# Patient Record
Sex: FEMALE | Race: WHITE | Employment: FULL TIME | URBAN - METROPOLITAN AREA
[De-identification: names, ages, dates, MRNs, and addresses within clinical notes are randomized per-mention and may not be internally consistent; named-entity substitution may affect disease eponyms.]

---

## 2021-08-27 ENCOUNTER — NURSE NAVIGATOR (OUTPATIENT)
Dept: CASE MANAGEMENT | Age: 61
End: 2021-08-27

## 2021-08-27 ENCOUNTER — DOCUMENTATION ONLY (OUTPATIENT)
Dept: SURGERY | Age: 61
End: 2021-08-27

## 2021-08-27 NOTE — PROGRESS NOTES
[address]  Breast Navigator Encounter    Name:    Nubia Gonzalez  Age:    64 y.o. Diagnosis:    RIGHT breast cancer      Interdisciplinary Team:  Med-Onc:    Surg-Onc:    Dr. Casper Raw:    Plastics:    :     Nurse Navigator:  Ladena Leyden, RN, BSN, [facility]      Encounter type:  []Patient Initiated  []Navigator Follow-up []Pre-op  []Post-op  []Check-in Prior to First Treatment []Treatment Modality Change  []Survivorship Transition [x]Other:   Initial Navigator Encounter    Narrative:    Called patient prior to her appt next week. She was diagnosed at [facility]. She has already had her breast MRI [facility]). Knows that the cancer is stage 1, ER+/MS+/HER-2-. I explained that this measures 1.8 cm on MRI, but she does have a post-biopsy hematoma. Discussed surgery. She is interested in a lumpectomy. We discussed recovery after a lumpectomy. Explained that if she has XRT after surgery, that this would start about one month post-op. Discussed scheduling of surgery. She was hoping to get scheduled in the next week or so. Has vacation scheduled for the beginning of October. I told her that she does not need to cancel this because surgery can be in September, and she could have radiation (if this is the next step) after she comes back later in October. Is nervous about surgery, particularly intubation. I told her to discuss this with anesthesia when she sees them pre-op. I explained Monday's appointment, and she is going to bring her  with her to that visit. Provided the patient with my contact information and discussed my role in her care. Will continue to follow patient throughout  the care continuum.               Ladena Leyden, RN, BSN, [facility]  Oncology Breast Navigator     [address]  [address] [facility], [facility], [address]  W: [phone]  F: [phone]  [email or web address]  Good Help to Those in [facility]

## 2021-08-27 NOTE — PROGRESS NOTES
Outside breast imaging CD received at DR WENDIE ANDREWS [facility] G11. Will place CD in top drawer on nurses station in preparation for appointment on Monday 8/30/21.

## 2021-08-30 ENCOUNTER — OFFICE VISIT (OUTPATIENT)
Dept: SURGERY | Age: 61
End: 2021-08-30
Payer: COMMERCIAL

## 2021-08-30 ENCOUNTER — DOCUMENTATION ONLY (OUTPATIENT)
Dept: SURGERY | Age: 61
End: 2021-08-30

## 2021-08-30 VITALS
DIASTOLIC BLOOD PRESSURE: 102 MMHG | HEIGHT: 64 IN | BODY MASS INDEX: 26.46 KG/M2 | SYSTOLIC BLOOD PRESSURE: 106 MMHG | HEART RATE: 83 BPM | WEIGHT: 155 LBS

## 2021-08-30 DIAGNOSIS — C50.411 MALIGNANT NEOPLASM OF UPPER-OUTER QUADRANT OF RIGHT BREAST IN FEMALE, ESTROGEN RECEPTOR POSITIVE (HCC): Primary | ICD-10-CM

## 2021-08-30 DIAGNOSIS — F41.9 ANXIETY: ICD-10-CM

## 2021-08-30 DIAGNOSIS — Z17.0 MALIGNANT NEOPLASM OF UPPER-OUTER QUADRANT OF RIGHT BREAST IN FEMALE, ESTROGEN RECEPTOR POSITIVE (HCC): Primary | ICD-10-CM

## 2021-08-30 PROCEDURE — 76642 ULTRASOUND BREAST LIMITED: CPT | Performed by: SURGERY

## 2021-08-30 PROCEDURE — 99245 OFF/OP CONSLTJ NEW/EST HI 55: CPT | Performed by: SURGERY

## 2021-08-30 RX ORDER — FEXOFENADINE HCL AND PSEUDOEPHEDRINE HCI 60; 120 MG/1; MG/1
1 TABLET, EXTENDED RELEASE ORAL
COMMUNITY

## 2021-08-30 RX ORDER — HYDROCHLOROTHIAZIDE 25 MG/1
25 TABLET ORAL DAILY
COMMUNITY
Start: 2021-08-24

## 2021-08-30 RX ORDER — FLUTICASONE PROPIONATE 50 MCG
2 SPRAY, SUSPENSION (ML) NASAL 2 TIMES DAILY
COMMUNITY

## 2021-08-30 RX ORDER — ALPRAZOLAM 0.5 MG/1
0.5 TABLET ORAL
Qty: 30 TABLET | Refills: 0 | Status: SHIPPED | OUTPATIENT
Start: 2021-08-30

## 2021-08-30 RX ORDER — METHYLPHENIDATE HYDROCHLORIDE 20 MG/1
TABLET ORAL
COMMUNITY
Start: 2021-06-23

## 2021-08-30 RX ORDER — LEVOTHYROXINE SODIUM 100 UG/1
TABLET ORAL
COMMUNITY
Start: 2021-07-03

## 2021-08-30 NOTE — PATIENT INSTRUCTIONS
Breast Cancer: Care Instructions  Your Care Instructions     Breast cancer occurs when abnormal cells grow out of control in the breast. These cancer cells can spread within the breast, to nearby lymph nodes and other tissues, and to other parts of the body. Being treated for cancer can weaken your body, and you may feel very tired. Get the rest your body needs so you can feel better. Finding out that you have cancer is scary. You may feel many emotions and may need some help coping. Seek out family, friends, and counselors for support. You also can do things at home to make yourself feel better while you go through treatment. Call the [employer] (3-467.918.5339) or visit its website at [employer] for more information. Follow-up care is a key part of your treatment and safety. Be sure to make and go to all appointments, and call your doctor if you are having problems. It's also a good idea to know your test results and keep a list of the medicines you take. How can you care for yourself at home? · Take your medicines exactly as prescribed. Call your doctor if you think you are having a problem with your medicine. You may get medicine for nausea and vomiting if you have these side effects. · Follow your doctor's instructions to relieve pain. Pain from cancer and surgery can almost always be controlled. Use pain medicine when you first notice pain, before it becomes severe. · Eat healthy food. If you do not feel like eating, try to eat food that has protein and extra calories to keep up your strength and prevent weight loss. Drink liquid meal replacements for extra calories and protein. Try to eat your main meal early. · Get some physical activity every day, but do not get too tired. Keep doing the hobbies you enjoy as your energy allows. · Do not smoke. Smoking can make your cancer worse. If you need help quitting, talk to your doctor about stop-smoking programs and medicines.  These can increase your chances of quitting for good. · Take steps to control your stress and workload. Learn relaxation techniques. ? Share your feelings. Stress and tension affect our emotions. By expressing your feelings to others, you may be able to understand and cope with them. ? Consider joining a support group. Talking about a problem with your spouse, a good friend, or other people with similar problems is a good way to reduce tension and stress. ? Express yourself through art. Try writing, crafts, dance, or art to relieve stress. Some dance, writing, or art groups may be available just for people who have cancer. ? Be kind to your body and mind. Getting enough sleep, eating a healthy diet, and taking time to do things you enjoy can contribute to an overall feeling of balance in your life and can help reduce stress. ? Get help if you need it. Discuss your concerns with your doctor or counselor. · If you are vomiting or have diarrhea:  ? Drink plenty of fluids to prevent dehydration. Choose water and other caffeine-free clear liquids. If you have kidney, heart, or liver disease and have to limit fluids, talk with your doctor before you increase the amount of fluids you drink. ? When you are able to eat, try clear soups, mild foods, and liquids until all symptoms are gone for 12 to 48 hours. Other good choices include dry toast, crackers, cooked cereal, and gelatin dessert, such as Jell-O.  · If you have not already done so, prepare a list of advance directives. Advance directives are instructions to your doctor and family members about what kind of care you want if you become unable to speak or express yourself. When should you call for help? Call 911 anytime you think you may need emergency care. For example, call if:    · You passed out (lost consciousness).    Call your doctor now or seek immediate medical care if:    · You have a fever.     · You have abnormal bleeding.     · You think you have an infection.     · You have new or worse pain.     · You have new symptoms, such as a cough, belly pain, vomiting, diarrhea, or a rash. Watch closely for changes in your health, and be sure to contact your doctor if:    · You are much more tired than usual.     · You have swollen glands in your armpits, groin, or neck.     · You do not get better as expected. Where can you learn more? Go to http://www.[employer].com/  Enter V321 in the search box to learn more about \"Breast Cancer: Care Instructions. \"  Current as of: December 17, 2020               Content Version: 12.8  © 6315-6879 [employer]. Care instructions adapted under license by [employer] (which disclaims liability or warranty for this information). If you have questions about a medical condition or this instruction, always ask your healthcare professional. [address] any warranty or liability for your use of this information.

## 2021-08-30 NOTE — PROGRESS NOTES
HISTORY OF PRESENT ILLNESS  Trever Baxter is a 64 y.o. female.   HPI    ROS    Physical Exam    ASSESSMENT and PLAN  {ASSESSMENT/PLAN:89403}

## 2021-08-30 NOTE — PROGRESS NOTES
HISTORY OF PRESENT ILLNESS  Bree Chandler is a 64 y.o. female. HPI NEW Patient presents for consultation for newly diagnosed RIGHT breast cancer. She had no abnormal breast symptoms to report. This was detected from mammogram which led to diagnostic mammogram, breast US, and subsequent biopsy. Biopsy site is healing well. No history or prior breast biopsies. Breast cancer-  8/10/21 - RIGHT breast 12 OC IDC, grade 1, ER positive 95%, RI positive 30%, HER2 negative. Family history-  Mother diagnosed with breast cancer at age 72. . Father had prostate cancer. Breast imaging-  21 - screening mammogram: BI-RADS 0  8/10/21 - RIGHT breast diagnostic mammogram done and breast US done at [facility]: BI-RADS 4 4 mm mass 12:00 right breast   21 - breast MRI done at [address] [place] [address]  History reviewed. No pertinent past medical history. History reviewed. No pertinent surgical history. Social History     Socioeconomic History    Marital status:      Spouse name: Not on file    Number of children: Not on file    Years of education: Not on file    Highest education level: Not on file   Occupational History    Not on file   Tobacco Use    Smoking status: Never Smoker   Substance and Sexual Activity    Alcohol use: Yes    Drug use: Not on file    Sexual activity: Not on file   Other Topics Concern    Not on file   Social History Narrative    Not on file     Social Determinants of Health     Financial Resource Strain:     Difficulty of Paying Living Expenses:    Food Insecurity:     Worried About Running Out of Food in the Last Year:     [address] in the Last Year:    Transportation Needs:     Lack of Transportation (Medical):      Lack of Transportation (Non-Medical):    Physical Activity:     Days of Exercise per Week:     Minutes of Exercise per Session:    Stress:     Feeling of Stress :    Social Connections:     Frequency of Communication with Friends and Family:  Frequency of Social Gatherings with Friends and Family:     Attends [employer] Services:     Active Member of Clubs or Organizations:     Attends Club or Organization Meetings:     Marital Status:    Intimate Partner Violence:     Fear of Current or Ex-Partner:     Emotionally Abused:     Physically Abused:     Sexually Abused:      OB History    No obstetric history on file. Obstetric Comments   Menarche 15, LMP 2012, # of children 2, age of 4st delivery 25, Hysterectomy/oophorectomy /NO/NO, Breast bx NO, history of breast feeding NO, BCP YES, Hormone therapy YES             Current Outpatient Medications:     fluticasone propionate (Children's Flonase Allergy Rlf) 50 mcg/actuation nasal spray, 2 Sprays by Both Nostrils route daily. , Disp: , Rfl:     fexofenadine-pseudoephedrine (Allegra-D 12 Hour)  mg Tb12, Take 1 Tablet by mouth every twelve (12) hours. , Disp: , Rfl:     OTHER, Allergy Injections [id number], Disp: , Rfl:     ALPRAZolam (XANAX) 0.5 mg tablet, Take 1 Tablet by mouth three (3) times daily as needed for Anxiety. Max Daily Amount: 1.5 mg., Disp: 30 Tablet, Rfl: 0    hydroCHLOROthiazide (HYDRODIURIL) 25 mg tablet, Take 25 mg by mouth daily. , Disp: , Rfl:     Euthyrox 100 mcg tablet, TAKE 1 TABLET BY MOUTH ONCE DAILY FOR 90 DAYS, Disp: , Rfl:     methylphenidate HCl (RITALIN) 20 mg tablet, TAKE 1 TABLET BY MOUTH THREE TIMES DAILY ON AN EMPTY STOMACH FOR 90 DAYS, Disp: , Rfl:   Allergies   Allergen Reactions    Aller Ext-American Cockroach Itching, Cough and Sneezing    Allerg Ext-Tall Ragweed Pollen Itching    Dog Dander Itching, Cough and Sneezing    Grass Pollen Itching and Sneezing    Mold Shortness of Breath, Itching and Cough    Percocet [Oxycodone-Acetaminophen] Nausea and Vomiting    Tree And Shrub Pollen Shortness of Breath, Itching and Sneezing    Weed Pollen-Short Ragweed Shortness of Breath, Itching, Cough and Sneezing       Review of Systems Constitutional: Negative. HENT: Negative. Eyes: Negative. Respiratory: Negative. Cardiovascular: Negative. Gastrointestinal: Negative. Genitourinary: Negative. Musculoskeletal: Negative. Skin: Negative. Neurological: Negative. Endo/Heme/Allergies: Negative. Psychiatric/Behavioral: Negative. All other systems reviewed and are negative. Physical Exam  Vitals and nursing note reviewed. Constitutional:       Appearance: She is well-developed. HENT:      Head: Normocephalic. Neck:      Thyroid: No thyromegaly. Cardiovascular:      Rate and Rhythm: Normal rate and regular rhythm. Heart sounds: Normal heart sounds. Pulmonary:      Effort: Pulmonary effort is normal.      Breath sounds: Normal breath sounds. Chest:      Breasts: Breasts are symmetrical.         Right: No inverted nipple, mass, nipple discharge, skin change or tenderness. Left: No inverted nipple, mass, nipple discharge, skin change or tenderness. Abdominal:      Palpations: Abdomen is soft. Musculoskeletal:         General: Normal range of motion. Cervical back: Neck supple. Lymphadenopathy:      Cervical: No cervical adenopathy. Skin:     General: Skin is warm and dry. Neurological:      Mental Status: She is alert and oriented to person, place, and time. BREAST ULTRASOUND, Preop planning  Indication:preop planning  right Breast 12:00    Technique: The area was scanned using a high-frequency linear-array near-field transducer  Findings: right breast lesion and clip seen 12:00 3 cfn  Impression: Biopsy site visible with ultrasound  Disposition:  Will schedule lumpectomy with sentinel lymph node biopsy  ASSESSMENT and PLAN    ICD-10-CM ICD-9-CM    1. Malignant neoplasm of upper-outer quadrant of right breast in female, estrogen receptor positive (HCC)  C50.411 174.4     Z17.0 V86.0    2.  Anxiety  F41.9 300.00 ALPRAZolam (XANAX) 0.5 mg tablet     65 yo female with Breast cancer-  8/10/21 - RIGHT breast 12 OC IDC, grade 1, ER positive 95%, [place] positive 30%, HER2 negative. She is here with her   I have reviewed the imaging and pathology with her and she was given copies of these reports. 90 minutes were spent face-to-face with the patient during this encounter and 90% of that time was spent on counseling and coordination of care. 1. Discussed lumpectomy and radiation vs mastectomy. Discussed reconstruction. 2. Discussed sentinel lymph node biopsy. 3. Discussed external beam radiation. 4. Discussed hormone therapy. 5. Discussed the possibility of chemotherapy. Plan is right us guided lumpectomy, sln biopsy  The procedure and risks were discussed. Risks include bleeding, bruising, scar, infection, need for more surgery and lymphedema. She understood and wished to proceed. Will schedule.

## 2021-08-30 NOTE — PROGRESS NOTES
Type of Film: [x] CD [] FILMS  Type of Test: [] MRI [x] MAMMO  From: [address]  Given to: [facility]  LOCATION  To be Downloaded into PACS:  YES

## 2021-08-30 NOTE — H&P (VIEW-ONLY)
HISTORY OF PRESENT ILLNESS  Bhavna Ojeda is a 64 y.o. female. HPI NEW Patient presents for consultation for newly diagnosed RIGHT breast cancer. She had no abnormal breast symptoms to report. This was detected from mammogram which led to diagnostic mammogram, breast US, and subsequent biopsy. Biopsy site is healing well. No history or prior breast biopsies. Breast cancer-  8/10/21 - RIGHT breast 12 OC IDC, grade 1, ER positive 95%, TN positive 30%, HER2 negative. Family history-  Mother diagnosed with breast cancer at age 72. . Father had prostate cancer. Breast imaging-  21 - screening mammogram: BI-RADS 0  8/10/21 - RIGHT breast diagnostic mammogram done and breast US done at [facility]: BI-RADS 4 4 mm mass 12:00 right breast   21 - breast MRI done at [address] [place] [address]  History reviewed. No pertinent past medical history. History reviewed. No pertinent surgical history. Social History     Socioeconomic History    Marital status:      Spouse name: Not on file    Number of children: Not on file    Years of education: Not on file    Highest education level: Not on file   Occupational History    Not on file   Tobacco Use    Smoking status: Never Smoker   Substance and Sexual Activity    Alcohol use: Yes    Drug use: Not on file    Sexual activity: Not on file   Other Topics Concern    Not on file   Social History Narrative    Not on file     Social Determinants of Health     Financial Resource Strain:     Difficulty of Paying Living Expenses:    Food Insecurity:     Worried About Running Out of Food in the Last Year:     [address] in the Last Year:    Transportation Needs:     Lack of Transportation (Medical):      Lack of Transportation (Non-Medical):    Physical Activity:     Days of Exercise per Week:     Minutes of Exercise per Session:    Stress:     Feeling of Stress :    Social Connections:     Frequency of Communication with Friends and Family:  Frequency of Social Gatherings with Friends and Family:     Attends Restorationist Services:     Active Member of Clubs or Organizations:     Attends Club or Organization Meetings:     Marital Status:    Intimate Partner Violence:     Fear of Current or Ex-Partner:     Emotionally Abused:     Physically Abused:     Sexually Abused:      OB History    No obstetric history on file. Obstetric Comments   Menarche 15, LMP 2012, # of children 2, age of 4st delivery 25, Hysterectomy/oophorectomy /NO/NO, Breast bx NO, history of breast feeding NO, BCP YES, Hormone therapy YES             Current Outpatient Medications:     fluticasone propionate (Children's Flonase Allergy Rlf) 50 mcg/actuation nasal spray, 2 Sprays by Both Nostrils route daily. , Disp: , Rfl:     fexofenadine-pseudoephedrine (Allegra-D 12 Hour)  mg Tb12, Take 1 Tablet by mouth every twelve (12) hours. , Disp: , Rfl:     OTHER, Allergy Injections [id number], Disp: , Rfl:     ALPRAZolam (XANAX) 0.5 mg tablet, Take 1 Tablet by mouth three (3) times daily as needed for Anxiety. Max Daily Amount: 1.5 mg., Disp: 30 Tablet, Rfl: 0    hydroCHLOROthiazide (HYDRODIURIL) 25 mg tablet, Take 25 mg by mouth daily. , Disp: , Rfl:     Euthyrox 100 mcg tablet, TAKE 1 TABLET BY MOUTH ONCE DAILY FOR 90 DAYS, Disp: , Rfl:     methylphenidate HCl (RITALIN) 20 mg tablet, TAKE 1 TABLET BY MOUTH THREE TIMES DAILY ON AN EMPTY STOMACH FOR 90 DAYS, Disp: , Rfl:   Allergies   Allergen Reactions    Aller Ext-American Cockroach Itching, Cough and Sneezing    Allerg Ext-Tall Ragweed Pollen Itching    Dog Dander Itching, Cough and Sneezing    Grass Pollen Itching and Sneezing    Mold Shortness of Breath, Itching and Cough    Percocet [Oxycodone-Acetaminophen] Nausea and Vomiting    Tree And Shrub Pollen Shortness of Breath, Itching and Sneezing    Weed Pollen-Short Ragweed Shortness of Breath, Itching, Cough and Sneezing       Review of Systems Constitutional: Negative. HENT: Negative. Eyes: Negative. Respiratory: Negative. Cardiovascular: Negative. Gastrointestinal: Negative. Genitourinary: Negative. Musculoskeletal: Negative. Skin: Negative. Neurological: Negative. Endo/Heme/Allergies: Negative. Psychiatric/Behavioral: Negative. All other systems reviewed and are negative. Physical Exam  Vitals and nursing note reviewed. Constitutional:       Appearance: She is well-developed. HENT:      Head: Normocephalic. Neck:      Thyroid: No thyromegaly. Cardiovascular:      Rate and Rhythm: Normal rate and regular rhythm. Heart sounds: Normal heart sounds. Pulmonary:      Effort: Pulmonary effort is normal.      Breath sounds: Normal breath sounds. Chest:      Breasts: Breasts are symmetrical.         Right: No inverted nipple, mass, nipple discharge, skin change or tenderness. Left: No inverted nipple, mass, nipple discharge, skin change or tenderness. Abdominal:      Palpations: Abdomen is soft. Musculoskeletal:         General: Normal range of motion. Cervical back: Neck supple. Lymphadenopathy:      Cervical: No cervical adenopathy. Skin:     General: Skin is warm and dry. Neurological:      Mental Status: She is alert and oriented to person, place, and time. BREAST ULTRASOUND, Preop planning  Indication:preop planning  right Breast 12:00    Technique: The area was scanned using a high-frequency linear-array near-field transducer  Findings: right breast lesion and clip seen 12:00 3 cfn  Impression: Biopsy site visible with ultrasound  Disposition:  Will schedule lumpectomy with sentinel lymph node biopsy  ASSESSMENT and PLAN    ICD-10-CM ICD-9-CM    1. Malignant neoplasm of upper-outer quadrant of right breast in female, estrogen receptor positive (HCC)  C50.411 174.4     Z17.0 V86.0    2.  Anxiety  F41.9 300.00 ALPRAZolam (XANAX) 0.5 mg tablet     65 yo female with Breast cancer-  8/10/21 - RIGHT breast 12 OC IDC, grade 1, ER positive 95%, [place] positive 30%, HER2 negative. She is here with her   I have reviewed the imaging and pathology with her and she was given copies of these reports. 90 minutes were spent face-to-face with the patient during this encounter and 90% of that time was spent on counseling and coordination of care. 1. Discussed lumpectomy and radiation vs mastectomy. Discussed reconstruction. 2. Discussed sentinel lymph node biopsy. 3. Discussed external beam radiation. 4. Discussed hormone therapy. 5. Discussed the possibility of chemotherapy. Plan is right us guided lumpectomy, sln biopsy  The procedure and risks were discussed. Risks include bleeding, bruising, scar, infection, need for more surgery and lymphedema. She understood and wished to proceed. Will schedule.

## 2021-09-02 ENCOUNTER — NURSE NAVIGATOR (OUTPATIENT)
Dept: SURGERY | Age: 61
End: 2021-09-02

## 2021-09-02 DIAGNOSIS — C50.411 MALIGNANT NEOPLASM OF UPPER-OUTER QUADRANT OF RIGHT BREAST IN FEMALE, ESTROGEN RECEPTOR POSITIVE (HCC): Primary | ICD-10-CM

## 2021-09-02 DIAGNOSIS — Z17.0 MALIGNANT NEOPLASM OF UPPER-OUTER QUADRANT OF RIGHT BREAST IN FEMALE, ESTROGEN RECEPTOR POSITIVE (HCC): Primary | ICD-10-CM

## 2021-09-03 ENCOUNTER — TRANSCRIBE ORDER (OUTPATIENT)
Dept: REGISTRATION | Age: 61
End: 2021-09-03

## 2021-09-03 DIAGNOSIS — Z01.812 PRE-PROCEDURE LAB EXAM: Primary | ICD-10-CM

## 2021-09-07 ENCOUNTER — NURSE NAVIGATOR (OUTPATIENT)
Dept: CASE MANAGEMENT | Age: 61
End: 2021-09-07

## 2021-09-07 ENCOUNTER — HOSPITAL ENCOUNTER (OUTPATIENT)
Dept: PREADMISSION TESTING | Age: 61
Discharge: HOME | End: 2021-09-07
Payer: COMMERCIAL

## 2021-09-07 VITALS
WEIGHT: 156.28 LBS | SYSTOLIC BLOOD PRESSURE: 131 MMHG | RESPIRATION RATE: 16 BRPM | TEMPERATURE: 98.5 F | HEART RATE: 62 BPM | OXYGEN SATURATION: 96 % | BODY MASS INDEX: 26.68 KG/M2 | HEIGHT: 64 IN | DIASTOLIC BLOOD PRESSURE: 77 MMHG

## 2021-09-07 LAB
ANION GAP SERPL CALC-SCNC: 6 MMOL/L (ref 5–15)
ATRIAL RATE: 64 BPM
BASOPHILS # BLD: 0 K/UL (ref 0–0.1)
BASOPHILS NFR BLD: 1 % (ref 0–1)
BUN SERPL-MCNC: 15 MG/DL (ref 6–20)
BUN/CREAT SERPL: 22 (ref 12–20)
CALCIUM SERPL-MCNC: 9 MG/DL (ref 8.5–10.1)
CALCULATED P AXIS, ECG09: 45 DEGREES
CALCULATED R AXIS, ECG10: 26 DEGREES
CALCULATED T AXIS, ECG11: 71 DEGREES
CHLORIDE SERPL-SCNC: 103 MMOL/L (ref 97–108)
CO2 SERPL-SCNC: 30 MMOL/L (ref 21–32)
CREAT SERPL-MCNC: 0.68 MG/DL (ref 0.55–1.02)
DIAGNOSIS, 93000: NORMAL
DIFFERENTIAL METHOD BLD: NORMAL
EOSINOPHIL # BLD: 0.1 K/UL (ref 0–0.4)
EOSINOPHIL NFR BLD: 2 % (ref 0–7)
ERYTHROCYTE [DISTWIDTH] IN BLOOD BY AUTOMATED COUNT: 12.8 % (ref 11.5–14.5)
GLUCOSE SERPL-MCNC: 92 MG/DL (ref 65–100)
HCT VFR BLD AUTO: 38.9 % (ref 35–47)
HGB BLD-MCNC: 12.6 G/DL (ref 11.5–16)
IMM GRANULOCYTES # BLD AUTO: 0 K/UL (ref 0–0.04)
IMM GRANULOCYTES NFR BLD AUTO: 0 % (ref 0–0.5)
LYMPHOCYTES # BLD: 1.5 K/UL (ref 0.8–3.5)
LYMPHOCYTES NFR BLD: 30 % (ref 12–49)
MCH RBC QN AUTO: 31 PG (ref 26–34)
MCHC RBC AUTO-ENTMCNC: 32.4 G/DL (ref 30–36.5)
MCV RBC AUTO: 95.6 FL (ref 80–99)
MONOCYTES # BLD: 0.4 K/UL (ref 0–1)
MONOCYTES NFR BLD: 7 % (ref 5–13)
NEUTS SEG # BLD: 3.1 K/UL (ref 1.8–8)
NEUTS SEG NFR BLD: 60 % (ref 32–75)
NRBC # BLD: 0 K/UL (ref 0–0.01)
NRBC BLD-RTO: 0 PER 100 WBC
P-R INTERVAL, ECG05: 154 MS
PLATELET # BLD AUTO: 285 K/UL (ref 150–400)
PMV BLD AUTO: 9.8 FL (ref 8.9–12.9)
POTASSIUM SERPL-SCNC: 4.1 MMOL/L (ref 3.5–5.1)
Q-T INTERVAL, ECG07: 434 MS
QRS DURATION, ECG06: 72 MS
QTC CALCULATION (BEZET), ECG08: 447 MS
RBC # BLD AUTO: 4.07 M/UL (ref 3.8–5.2)
SODIUM SERPL-SCNC: 139 MMOL/L (ref 136–145)
VENTRICULAR RATE, ECG03: 64 BPM
WBC # BLD AUTO: 5.2 K/UL (ref 3.6–11)

## 2021-09-07 PROCEDURE — 36415 COLL VENOUS BLD VENIPUNCTURE: CPT

## 2021-09-07 PROCEDURE — 85025 COMPLETE CBC W/AUTO DIFF WBC: CPT

## 2021-09-07 PROCEDURE — 93005 ELECTROCARDIOGRAM TRACING: CPT

## 2021-09-07 PROCEDURE — 80048 BASIC METABOLIC PNL TOTAL CA: CPT

## 2021-09-07 RX ORDER — BISMUTH SUBSALICYLATE 262 MG
1 TABLET,CHEWABLE ORAL DAILY
COMMUNITY

## 2021-09-07 RX ORDER — CHOLECALCIFEROL TAB 125 MCG (5000 UNIT) 125 MCG
5000 TAB ORAL DAILY
COMMUNITY

## 2021-09-07 NOTE — PROGRESS NOTES
[address]  Breast Navigator Encounter    Name:    Lloyd Freire  Age:    64 y.o. Diagnosis:    RIGHT breast cancer    Interdisciplinary Team:  Med-Onc:    Surg-Onc:    Dr. Dale Kang:    Plastics:    :     Nurse Navigator:  Adi Gould, RN, BSN, [facility] [place]      Encounter type:  [x]Patient Initiated  []Navigator Follow-up []Pre-op  []Post-op  []Check-in Prior to First Treatment []Treatment Modality Change  []Survivorship Transition []Other:       Narrative:    Asked about anesthesia providers for ELADIO BECK. Wants to make sure her insurance participates. I let her know that the anesthesia group is called [facility]. I told her I was pretty certain Nela participated with them, but she can call her insurance company to make sure, which she will do. ADDENDUM:  Patient and Nela called me later in the day. Nela was looking for a billing address in order to check to make sure they were participating. I don't have a billing address for the company. I tried to call [employer], but there was only an automated system in place. No one answered when I pushed \"0\" to be placed in the que. The web site does say that they participate with Nela. I called patient at 10:20 on 9/8, and left a message for the patient regarding the above.             Adi Gould, RN, BSN, [facility]  Oncology Breast Navigator     [address]  [address] [facility] [address]  W: [phone]  F: [phone]  [email or web address]  Good Help to Those in [facility]

## 2021-09-07 NOTE — PERIOP NOTES
PREOP, VERBAL & WRITTEN INSTRUCTIONS GIVEN TO PT. PATIENT GIVEN SURGICAL SITE INFECTION FAQs HANDOUT. PT GIVEN 2 BOTTLES OF CHG SOL WITH VERBAL & WRITTEN INSTRUCTIONS. PT GIVEN OPPORTUNITY TO EXPRESS CONCERNS & ASK QUESTIONS.     DOS PROCEDURE/PROTOCOL INSTRUCTIONS GIVEN TO PT.

## 2021-09-09 ENCOUNTER — HOSPITAL ENCOUNTER (OUTPATIENT)
Dept: PREADMISSION TESTING | Age: 61
Discharge: HOME | End: 2021-09-09
Payer: COMMERCIAL

## 2021-09-09 DIAGNOSIS — Z01.812 PRE-PROCEDURE LAB EXAM: ICD-10-CM

## 2021-09-09 PROCEDURE — U0005 INFEC AGEN DETEC AMPLI PROBE: HCPCS

## 2021-09-10 LAB
SARS-COV-2, XPLCVT: NOT DETECTED
SOURCE, COVRS: NORMAL

## 2021-09-14 ENCOUNTER — APPOINTMENT (OUTPATIENT)
Dept: NUCLEAR MEDICINE | Age: 61
End: 2021-09-14
Attending: SURGERY
Payer: COMMERCIAL

## 2021-09-14 ENCOUNTER — HOSPITAL ENCOUNTER (OUTPATIENT)
Age: 61
Setting detail: OUTPATIENT SURGERY
Discharge: HOME | End: 2021-09-14
Attending: SURGERY | Admitting: SURGERY
Payer: COMMERCIAL

## 2021-09-14 ENCOUNTER — ANESTHESIA (OUTPATIENT)
Dept: MEDSURG UNIT | Age: 61
End: 2021-09-14
Payer: COMMERCIAL

## 2021-09-14 ENCOUNTER — ANESTHESIA EVENT (OUTPATIENT)
Dept: MEDSURG UNIT | Age: 61
End: 2021-09-14
Payer: COMMERCIAL

## 2021-09-14 VITALS
HEART RATE: 78 BPM | OXYGEN SATURATION: 97 % | TEMPERATURE: 97.6 F | RESPIRATION RATE: 20 BRPM | DIASTOLIC BLOOD PRESSURE: 75 MMHG | SYSTOLIC BLOOD PRESSURE: 116 MMHG

## 2021-09-14 DIAGNOSIS — C50.411 MALIGNANT NEOPLASM OF UPPER-OUTER QUADRANT OF RIGHT BREAST IN FEMALE, ESTROGEN RECEPTOR POSITIVE (HCC): ICD-10-CM

## 2021-09-14 DIAGNOSIS — Z17.0 MALIGNANT NEOPLASM OF UPPER-OUTER QUADRANT OF RIGHT BREAST IN FEMALE, ESTROGEN RECEPTOR POSITIVE (HCC): ICD-10-CM

## 2021-09-14 PROCEDURE — 77030002996 HC SUT SLK J&J -A: Performed by: SURGERY

## 2021-09-14 PROCEDURE — 88307 TISSUE EXAM BY PATHOLOGIST: CPT

## 2021-09-14 PROCEDURE — 38525 BIOPSY/REMOVAL LYMPH NODES: CPT | Performed by: SURGERY

## 2021-09-14 PROCEDURE — 74011000250 HC RX REV CODE- 250: Performed by: NURSE ANESTHETIST, CERTIFIED REGISTERED

## 2021-09-14 PROCEDURE — 76998 US GUIDE INTRAOP: CPT | Performed by: SURGERY

## 2021-09-14 PROCEDURE — 74011250637 HC RX REV CODE- 250/637: Performed by: SURGERY

## 2021-09-14 PROCEDURE — 77030002933 HC SUT MCRYL J&J -A: Performed by: SURGERY

## 2021-09-14 PROCEDURE — 2709999900 HC NON-CHARGEABLE SUPPLY

## 2021-09-14 PROCEDURE — 76030000001 HC AMB SURG OR TIME 1 TO 1.5: Performed by: SURGERY

## 2021-09-14 PROCEDURE — 74011250636 HC RX REV CODE- 250/636: Performed by: SURGERY

## 2021-09-14 PROCEDURE — 76210000037 HC AMBSU PH I REC 2 TO 2.5 HR: Performed by: SURGERY

## 2021-09-14 PROCEDURE — C1894 INTRO/SHEATH, NON-LASER: HCPCS | Performed by: SURGERY

## 2021-09-14 PROCEDURE — C9290 INJ, BUPIVACAINE LIPOSOME: HCPCS | Performed by: SURGERY

## 2021-09-14 PROCEDURE — 76060000062 HC AMB SURG ANES 1 TO 1.5 HR: Performed by: SURGERY

## 2021-09-14 PROCEDURE — 74011250636 HC RX REV CODE- 250/636: Performed by: NURSE ANESTHETIST, CERTIFIED REGISTERED

## 2021-09-14 PROCEDURE — 74011250637 HC RX REV CODE- 250/637: Performed by: ANESTHESIOLOGY

## 2021-09-14 PROCEDURE — 77030040361 HC SLV COMPR DVT MDII -B: Performed by: SURGERY

## 2021-09-14 PROCEDURE — 77030040922 HC BLNKT HYPOTHRM STRY -A

## 2021-09-14 PROCEDURE — 74011000258 HC RX REV CODE- 258: Performed by: NURSE ANESTHETIST, CERTIFIED REGISTERED

## 2021-09-14 PROCEDURE — 74011250636 HC RX REV CODE- 250/636: Performed by: ANESTHESIOLOGY

## 2021-09-14 PROCEDURE — A9520 TC99 TILMANOCEPT DIAG 0.5MCI: HCPCS

## 2021-09-14 PROCEDURE — 77030034626 HC LIGASURE SM JAW SEAL OPN SURG COVD -E: Performed by: SURGERY

## 2021-09-14 PROCEDURE — 2709999900 HC NON-CHARGEABLE SUPPLY: Performed by: SURGERY

## 2021-09-14 PROCEDURE — 19301 PARTIAL MASTECTOMY: CPT | Performed by: SURGERY

## 2021-09-14 PROCEDURE — 77030011267 HC ELECTRD BLD COVD -A: Performed by: SURGERY

## 2021-09-14 PROCEDURE — 77030041680 HC PNCL ELECSURG SMK EVAC CNMD -B: Performed by: SURGERY

## 2021-09-14 PROCEDURE — 77030010509 HC AIRWY LMA MSK TELE -A: Performed by: ANESTHESIOLOGY

## 2021-09-14 PROCEDURE — 77030010507 HC ADH SKN DERMBND J&J -B: Performed by: SURGERY

## 2021-09-14 RX ORDER — EPHEDRINE SULFATE/0.9% NACL/PF 50 MG/5 ML
SYRINGE (ML) INTRAVENOUS AS NEEDED
Status: DISCONTINUED | OUTPATIENT
Start: 2021-09-14 | End: 2021-09-14 | Stop reason: HOSPADM

## 2021-09-14 RX ORDER — SODIUM CHLORIDE 0.9 % (FLUSH) 0.9 %
5-40 SYRINGE (ML) INJECTION EVERY 8 HOURS
Status: DISCONTINUED | OUTPATIENT
Start: 2021-09-14 | End: 2021-09-14 | Stop reason: HOSPADM

## 2021-09-14 RX ORDER — SODIUM CHLORIDE 0.9 % (FLUSH) 0.9 %
5-40 SYRINGE (ML) INJECTION AS NEEDED
Status: DISCONTINUED | OUTPATIENT
Start: 2021-09-14 | End: 2021-09-14 | Stop reason: HOSPADM

## 2021-09-14 RX ORDER — ONDANSETRON 2 MG/ML
4 INJECTION INTRAMUSCULAR; INTRAVENOUS AS NEEDED
Status: DISCONTINUED | OUTPATIENT
Start: 2021-09-14 | End: 2021-09-14 | Stop reason: HOSPADM

## 2021-09-14 RX ORDER — FENTANYL CITRATE 50 UG/ML
INJECTION, SOLUTION INTRAMUSCULAR; INTRAVENOUS AS NEEDED
Status: DISCONTINUED | OUTPATIENT
Start: 2021-09-14 | End: 2021-09-14 | Stop reason: HOSPADM

## 2021-09-14 RX ORDER — MORPHINE SULFATE 2 MG/ML
2 INJECTION, SOLUTION INTRAMUSCULAR; INTRAVENOUS
Status: DISCONTINUED | OUTPATIENT
Start: 2021-09-14 | End: 2021-09-14 | Stop reason: HOSPADM

## 2021-09-14 RX ORDER — TRAMADOL HYDROCHLORIDE 50 MG/1
50 TABLET ORAL
Qty: 20 TABLET | Refills: 0 | Status: SHIPPED | OUTPATIENT
Start: 2021-09-14 | End: 2021-09-21

## 2021-09-14 RX ORDER — LIDOCAINE HYDROCHLORIDE 10 MG/ML
0.1 INJECTION, SOLUTION EPIDURAL; INFILTRATION; INTRACAUDAL; PERINEURAL AS NEEDED
Status: DISCONTINUED | OUTPATIENT
Start: 2021-09-14 | End: 2021-09-14 | Stop reason: HOSPADM

## 2021-09-14 RX ORDER — LIDOCAINE HYDROCHLORIDE 20 MG/ML
INJECTION, SOLUTION EPIDURAL; INFILTRATION; INTRACAUDAL; PERINEURAL AS NEEDED
Status: DISCONTINUED | OUTPATIENT
Start: 2021-09-14 | End: 2021-09-14 | Stop reason: HOSPADM

## 2021-09-14 RX ORDER — PHENYLEPHRINE HCL IN 0.9% NACL 0.4MG/10ML
SYRINGE (ML) INTRAVENOUS AS NEEDED
Status: DISCONTINUED | OUTPATIENT
Start: 2021-09-14 | End: 2021-09-14 | Stop reason: HOSPADM

## 2021-09-14 RX ORDER — HYDROMORPHONE HYDROCHLORIDE 1 MG/ML
0.2 INJECTION, SOLUTION INTRAMUSCULAR; INTRAVENOUS; SUBCUTANEOUS
Status: DISCONTINUED | OUTPATIENT
Start: 2021-09-14 | End: 2021-09-14 | Stop reason: HOSPADM

## 2021-09-14 RX ORDER — SODIUM CHLORIDE, SODIUM LACTATE, POTASSIUM CHLORIDE, CALCIUM CHLORIDE 600; 310; 30; 20 MG/100ML; MG/100ML; MG/100ML; MG/100ML
INJECTION, SOLUTION INTRAVENOUS
Status: DISCONTINUED | OUTPATIENT
Start: 2021-09-14 | End: 2021-09-14 | Stop reason: HOSPADM

## 2021-09-14 RX ORDER — ONDANSETRON 4 MG/1
4 TABLET, ORALLY DISINTEGRATING ORAL
Qty: 5 TABLET | Refills: 1 | Status: SHIPPED | OUTPATIENT
Start: 2021-09-14 | End: 2021-10-21

## 2021-09-14 RX ORDER — CEFAZOLIN SODIUM 1 G/3ML
INJECTION, POWDER, FOR SOLUTION INTRAMUSCULAR; INTRAVENOUS AS NEEDED
Status: DISCONTINUED | OUTPATIENT
Start: 2021-09-14 | End: 2021-09-14 | Stop reason: HOSPADM

## 2021-09-14 RX ORDER — SODIUM CHLORIDE, SODIUM LACTATE, POTASSIUM CHLORIDE, CALCIUM CHLORIDE 600; 310; 30; 20 MG/100ML; MG/100ML; MG/100ML; MG/100ML
50 INJECTION, SOLUTION INTRAVENOUS CONTINUOUS
Status: DISCONTINUED | OUTPATIENT
Start: 2021-09-14 | End: 2021-09-14 | Stop reason: HOSPADM

## 2021-09-14 RX ORDER — PROPOFOL 10 MG/ML
INJECTION, EMULSION INTRAVENOUS
Status: DISCONTINUED | OUTPATIENT
Start: 2021-09-14 | End: 2021-09-14 | Stop reason: HOSPADM

## 2021-09-14 RX ORDER — FENTANYL CITRATE 50 UG/ML
25 INJECTION, SOLUTION INTRAMUSCULAR; INTRAVENOUS
Status: DISCONTINUED | OUTPATIENT
Start: 2021-09-14 | End: 2021-09-14 | Stop reason: HOSPADM

## 2021-09-14 RX ORDER — NALOXONE HYDROCHLORIDE 4 MG/.1ML
SPRAY NASAL
Qty: 1 EACH | Refills: 0 | Status: SHIPPED | OUTPATIENT
Start: 2021-09-14 | End: 2021-10-21

## 2021-09-14 RX ORDER — MIDAZOLAM HYDROCHLORIDE 1 MG/ML
INJECTION, SOLUTION INTRAMUSCULAR; INTRAVENOUS AS NEEDED
Status: DISCONTINUED | OUTPATIENT
Start: 2021-09-14 | End: 2021-09-14 | Stop reason: HOSPADM

## 2021-09-14 RX ORDER — OXYCODONE HYDROCHLORIDE 5 MG/1
5 TABLET ORAL
Status: DISCONTINUED | OUTPATIENT
Start: 2021-09-14 | End: 2021-09-14 | Stop reason: HOSPADM

## 2021-09-14 RX ORDER — ACETAMINOPHEN 325 MG/1
650 TABLET ORAL ONCE
Status: COMPLETED | OUTPATIENT
Start: 2021-09-14 | End: 2021-09-14

## 2021-09-14 RX ORDER — SCOLOPAMINE TRANSDERMAL SYSTEM 1 MG/1
1 PATCH, EXTENDED RELEASE TRANSDERMAL
Status: DISCONTINUED | OUTPATIENT
Start: 2021-09-14 | End: 2021-09-14 | Stop reason: HOSPADM

## 2021-09-14 RX ORDER — TRAMADOL HYDROCHLORIDE 50 MG/1
50 TABLET ORAL ONCE
Status: COMPLETED | OUTPATIENT
Start: 2021-09-14 | End: 2021-09-14

## 2021-09-14 RX ADMIN — LIDOCAINE HYDROCHLORIDE 60 MG: 20 INJECTION, SOLUTION EPIDURAL; INFILTRATION; INTRACAUDAL; PERINEURAL at 09:41

## 2021-09-14 RX ADMIN — FENTANYL CITRATE 25 MCG: 50 INJECTION, SOLUTION INTRAMUSCULAR; INTRAVENOUS at 11:20

## 2021-09-14 RX ADMIN — Medication 5 MG: at 09:47

## 2021-09-14 RX ADMIN — PROPOFOL 170 MG: 10 INJECTION, EMULSION INTRAVENOUS at 09:41

## 2021-09-14 RX ADMIN — FENTANYL CITRATE 25 MCG: 50 INJECTION, SOLUTION INTRAMUSCULAR; INTRAVENOUS at 10:14

## 2021-09-14 RX ADMIN — DEXMEDETOMIDINE HYDROCHLORIDE 4 MCG: 100 INJECTION, SOLUTION, CONCENTRATE INTRAVENOUS at 10:00

## 2021-09-14 RX ADMIN — Medication 5 MG: at 10:12

## 2021-09-14 RX ADMIN — CEFAZOLIN 2 G: 330 INJECTION, POWDER, FOR SOLUTION INTRAMUSCULAR; INTRAVENOUS at 09:48

## 2021-09-14 RX ADMIN — MIDAZOLAM 2 MG: 1 INJECTION INTRAMUSCULAR; INTRAVENOUS at 09:35

## 2021-09-14 RX ADMIN — Medication 5 MG: at 09:54

## 2021-09-14 RX ADMIN — FENTANYL CITRATE 50 MCG: 50 INJECTION, SOLUTION INTRAMUSCULAR; INTRAVENOUS at 09:36

## 2021-09-14 RX ADMIN — Medication 10 MG: at 09:45

## 2021-09-14 RX ADMIN — PROPOFOL 25 MCG/KG/MIN: 10 INJECTION, EMULSION INTRAVENOUS at 09:53

## 2021-09-14 RX ADMIN — ACETAMINOPHEN 650 MG: 325 TABLET ORAL at 08:54

## 2021-09-14 RX ADMIN — DEXMEDETOMIDINE HYDROCHLORIDE 4 MCG: 100 INJECTION, SOLUTION, CONCENTRATE INTRAVENOUS at 09:44

## 2021-09-14 RX ADMIN — Medication 40 MCG: at 10:23

## 2021-09-14 RX ADMIN — DEXMEDETOMIDINE HYDROCHLORIDE 6 MCG: 100 INJECTION, SOLUTION, CONCENTRATE INTRAVENOUS at 09:38

## 2021-09-14 RX ADMIN — TRAMADOL HYDROCHLORIDE 50 MG: 50 TABLET, FILM COATED ORAL at 12:35

## 2021-09-14 RX ADMIN — SODIUM CHLORIDE, POTASSIUM CHLORIDE, SODIUM LACTATE AND CALCIUM CHLORIDE 50 ML/HR: 600; 310; 30; 20 INJECTION, SOLUTION INTRAVENOUS at 08:54

## 2021-09-14 RX ADMIN — SODIUM CHLORIDE, POTASSIUM CHLORIDE, SODIUM LACTATE AND CALCIUM CHLORIDE: 600; 310; 30; 20 INJECTION, SOLUTION INTRAVENOUS at 09:38

## 2021-09-14 RX ADMIN — DEXMEDETOMIDINE HYDROCHLORIDE 6 MCG: 100 INJECTION, SOLUTION, CONCENTRATE INTRAVENOUS at 09:51

## 2021-09-14 NOTE — INTERVAL H&P NOTE
Update History & Physical    The Patient's History and Physical of 8/30/2021   Right us guided lumpectomy, sln biopsy  was reviewed with the patient and I examined the patient. There was no change. The surgical site was confirmed by the patient and me. Plan:  The risk, benefits, expected outcome, and alternative to the recommended procedure have been discussed with the patient. Patient understands and wants to proceed with the procedure.     Electronically signed by Carlos Manuel Dutta MD on 9/14/2021 at 8:16 AM

## 2021-09-14 NOTE — ANESTHESIA PREPROCEDURE EVALUATION
Relevant Problems   No relevant active problems       Anesthetic History     PONV          Review of Systems / Medical History  Patient summary reviewed, nursing notes reviewed and pertinent labs reviewed    Pulmonary  Within defined limits                 Neuro/Psych   Within defined limits           Cardiovascular    Hypertension: well controlled              Exercise tolerance: >4 METS     GI/Hepatic/Renal                Endo/Other      Hypothyroidism       Other Findings              Physical Exam    Airway  Mallampati: III  TM Distance: > 6 cm  Neck ROM: normal range of motion   Mouth opening: Normal     Cardiovascular    Rhythm: regular           Dental  No notable dental hx       Pulmonary  Breath sounds clear to auscultation               Abdominal         Other Findings            Anesthetic Plan    ASA: 2  Anesthesia type: general          Induction: Intravenous  Anesthetic plan and risks discussed with: Patient

## 2021-09-14 NOTE — PERIOP NOTES
Spouse has been updated. No questions. Suggested he have lunch while waiting for wife to be ready for discharge.

## 2021-09-14 NOTE — OP NOTES
[address] [facility]  OPERATIVE REPORT    Name:  Millicent Proctor  MR#:  [id number]  :  1960  ACCOUNT #:  [de-identified]  DATE OF SERVICE:  2021      PREOPERATIVE DIAGNOSIS:  Right breast cancer, upper outer quadrant. POSTOPERATIVE DIAGNOSIS:  Right breast cancer, upper outer quadrant. PROCEDURES PERFORMED:  Right breast ultrasound-guided lumpectomy, right deep axillary sentinel lymph node biopsy, intraoperative margin assessment using radiofrequency spectroscopy, placement of VeraForm suture. SURGEON:  Nisha Akbar MD    ASSISTANT:  Diane Preston. ANESTHESIA:  General.    COMPLICATIONS:  None. SPECIMENS REMOVED:  1. Right axillary sentinel node #1.  2.  Right axillary sentinel node #2.  3.  Right breast lumpectomy. IMPLANTS:  None. ESTIMATED BLOOD LOSS:  Minimal.    DRAINS:  None. FINDINGS:  [place] lymph nodes sent for permanent pathology. INDICATIONS FOR PROCEDURE:  A 60-year-old female with right breast cancer at 12 o'clock. She wished to have a lumpectomy, deep axillary sentinel node biopsy. PROCEDURE IN DETAIL:  The patient was seen and initially went to Nuclear Medicine where technetium-99 was injected to the right breast.  She tolerated this well. She went to the preoperative holding area where surgical site was marked by surgeon. Informed consent was obtained. She was taken to the operating room, laid in supine position where LMA anesthesia was induced. Right breast was prepped and draped in the usual fashion and time-out was performed. Attention was turned to the right axilla. An inferior axillary hairline incision made with a 10-blade. Bovie cautery was used to dissect through the axillary fascia. Two sentinel nodes were identified using Neoprobe guidance, excised with LigaSure, and sent for permanent pathology. These were both normal in gross size and appearance. Next, the axillary cavity was irrigated.   A mixture of 20 mL of Exparel with 30 mL of 0.25% Marcaine plain was mixed together for a total of 50 mL and 20 mL of this was injected in the right axillary tissue and skin. The axillary fascia was closed with interrupted 3-0 Vicryl and skin with 4-0 subcuticular Monocryl. Next, attention was turned to 12 o'clock at the right breast.  This lesion was 2 cm from the nipple at 12 o'clock. Therefore, a periareolar incision was made with a 10-blade. Bovie cautery was used to dissect down and around the lesion using ultrasound guidance. This was excised and marked short superior, long stitch lateral.  MarginProbe device was plugged in and calibrated. All six margins were assessed. For additional margins, please see above. Total intraoperative time for margin assessment was 2 minutes. Next, the cavity was irrigated. 30 mL of Exparel mixture was injected into the breast tissue and skin. The deeper tissues were closed with interrupted 2-0 Vicryl and the skin with interrupted 3-0 Vicryl, 4-0 subcuticular Monocryl. Prior to closure, a VeraForm radiopaque suture was used to circumferentially outline the lumpectomy cavity for postoperative radiation therapy. Skin glue was placed on the incisions as well as a pressure dressing and a bra. All sponge, needle, and instrument counts were correct. The patient went to recovery room in stable condition.         MD RICK Johnson/[id number]  D:  09/14/2021 10:43  T:  09/14/2021 11:51  JOB #:  [id number]

## 2021-09-14 NOTE — BRIEF OP NOTE
Brief Postoperative Note    Patient: Maryse Moss  YOB: 1960  MRN: [id number]    Date of Procedure: 9/14/2021     Pre-Op Diagnosis: RIGHT BREAST CANCER    Post-Op Diagnosis: Same as preoperative diagnosis. Procedure(s):  RIGHT BREAST LUMPECTOMY WITH ULTRASOUND  RIGHT AXILLARY SENTINEL NODE BIOPSY    Surgeon(s):   Denis Duncan MD    Surgical Assistant: Surg Asst-1: Rosa TOUSSAINT    Anesthesia: General     Estimated Blood Loss (mL): Minimal    Complications: None    Specimens:   ID Type Source Tests Collected by Time Destination   1 : RIGHT AXILLARY SENTINEL NODE #1 Fresh Lymph Node  Denis Duncan MD 9/14/2021 1007 Pathology   2 : RIGHT AXILLARY SENTINEL NODE #2 Fresh Lymph Node  Denis Duncan MD 9/14/2021 1007 Pathology   3 : RIGHT BREAST LUMPECTOMY Fresh Breast  Denis Duncan MD 9/14/2021 1016 Pathology        Implants: * No implants in log *    Drains: * No LDAs found *    Findings: sln sent for permanent    Electronically Signed by Chandrakant Figueroa MD on 9/14/2021 at 10:40 AM  Dictated stat

## 2021-09-14 NOTE — DISCHARGE INSTRUCTIONS
Discharge Instructions from Dr. Tino Jaime    · I will call you with the pathology results, typically within 1 week from today. · You may shower, but no hot tubs, swimming pools, or baths until your incision is healed. · No heavy lifting with the affected extremity (nothing greater than 5 pounds), and limit its use for the next 4-5 days. · You may use an ice pack for comfort for the next couple of days, but do not place ice directly on the skin. Rather, use a towel or clothing to serve as a barrier between skin and ice to prevent injury. · If I placed a drain, follow the drain instructions provided, especially as you keep a record of the drain output. · Follow medication instructions carefully. No aspirin, ibuprofen or aleve. May take tylenol. · Wear surgical bra for 24 hours, then remove. Wear supportive bra at all times. · You will have bruising and swelling  · Watch for signs of infection as listed below. · Redness  · Swelling  · Drainage from the incision or from your nipple that appears infected  · Fever over 101.5 degrees for consecutive readings, or over 99.5 if you are currently undergoing chemotherapy. · Call our office (number is below) for a follow-up appointment. · If you have any problems, our phone number is [phone]    You have a scopolamine patch behind your LEFT ear. This is to help prevent nausea. This patch can be worn for up to 3 days. The most common side effects are dry mouth/dry eyes. If you are not experiencing nausea and are experiencing side effects you may remove the patch sooner. Please remove the patch no later than 3 DAYS. Be sure to dispose of patch where children or pets cannot access it, wash your hands thoroughly, and do not touch your eyes. You had exparel, a long acting local anesthetic or numbing medication, injected into your surgical site during your procedure today.  This medication stays in your system for 96 hours, or 4 days to help with post-operative pain control. You have a teal bracelet on your wrist to indicate this. Please do not remove this bracelet for 4 days,  so that other health care providers can know you have had this medication. DISCHARGE SUMMARY from Nurse POST  ANESTHESIA INSTRUCTIONS    PATIENT INSTRUCTIONS:    After general anesthesia or intravenous sedation, for 24 hours or while taking prescription Narcotics:  · Limit your activities  · Do not drive and operate hazardous machinery  · Do not make important personal or business decisions  · Do  not drink alcoholic beverages  · If you have not urinated within 8 hours after discharge, please contact your surgeon on call. Report the following to your surgeon:  · Excessive pain, swelling, redness or odor of or around the surgical area  · Temperature over 100.5  · Nausea and vomiting lasting longer than 4 hours or if unable to take medications  · Any signs of decreased circulation or nerve impairment to extremity: change in color, persistent  numbness, tingling, coldness or increase pain  · Any questions    What to do at Home:      If you have any concerns, please follow up with Dr Westley Nunez. *  Please give a list of your current medications to your Primary Care Provider. *  Please update this list whenever your medications are discontinued, doses are      changed, or new medications (including over-the-counter products) are added. *  Please carry medication information at all times in case of emergency situations. These are general instructions for a healthy lifestyle:    No smoking/ No tobacco products/ Avoid exposure to second hand smoke  Surgeon General's Warning:  Quitting smoking now greatly reduces serious risk to your health.     Obesity, smoking, and sedentary lifestyle greatly increases your risk for illness    A healthy diet, regular physical exercise & weight monitoring are important for maintaining a healthy lifestyle    You may be retaining fluid if you have a history of heart failure or if you experience any of the following symptoms:  Weight gain of 3 pounds or more overnight or 5 pounds in a week, increased swelling in our hands or feet or shortness of breath while lying flat in bed. Please call your doctor as soon as you notice any of these symptoms; do not wait until your next office visit. The discharge information has been reviewed with the patient and spouse. The patient and spouse verbalized understanding. Discharge medications reviewed with the patient and spouse and appropriate educational materials and side effects teaching were provided.   ___________________________________________________________________________________________________________________________________

## 2021-09-15 NOTE — ANESTHESIA POSTPROCEDURE EVALUATION
Post-Anesthesia Evaluation and Assessment    Patient: Agustina Thompson MRN: [id number]  SSN: [id number]    YOB: 1960  Age: 64 y.o. Sex: female      I have evaluated the patient and they are stable and ready for discharge from the PACU. Cardiovascular Function/Vital Signs  Visit Vitals  /75   Pulse 78   Temp 36.4 °C (97.6 °F)   Resp 20   SpO2 97%       Patient is status post General anesthesia for Procedure(s):  RIGHT BREAST LUMPECTOMY WITH ULTRASOUND  RIGHT AXILLARY SENTINEL NODE BIOPSY. Nausea/Vomiting: None    Postoperative hydration reviewed and adequate. Pain:  Pain Scale 1: Numeric (0 - 10) (09/14/21 1230)  Pain Intensity 1: 2 (09/14/21 1230)   Managed    Neurological Status:   Neuro (WDL): Within Defined Limits (09/14/21 1230)  Neuro  Neurologic State: Alert (09/14/21 1230)   At baseline    Mental Status, Level of Consciousness: Alert and  oriented to person, place, and time    Pulmonary Status:   O2 Device: None (Room air) (09/14/21 1223)   Adequate oxygenation and airway patent    Complications related to anesthesia: None    Post-anesthesia assessment completed. No concerns    Signed By: Romy Costa MD     September 15, 2021              Procedure(s):  RIGHT BREAST LUMPECTOMY WITH ULTRASOUND  RIGHT AXILLARY SENTINEL NODE BIOPSY. general    <BSHSIANPOST>    INITIAL Post-op Vital signs:   Vitals Value Taken Time   /75 09/14/21 1245   Temp 36.4 °C (97.6 °F) 09/14/21 1054   Pulse 79 09/14/21 1247   Resp 19 09/14/21 1247   SpO2 95 % 09/14/21 1247   Vitals shown include unvalidated device data.

## 2021-09-17 ENCOUNTER — TELEPHONE (OUTPATIENT)
Dept: SURGERY | Age: 61
End: 2021-09-17

## 2021-09-17 NOTE — TELEPHONE ENCOUNTER
Calling to check on patient. She is doing well. Very little pain and she is thankful. Asked questions about shower and returning to work. She will need a RTW note when she comes in for her post op. I told her to let the nurse know she needs this at her appointment. She was appreciative of phone call.

## 2021-09-27 ENCOUNTER — OFFICE VISIT (OUTPATIENT)
Dept: SURGERY | Age: 61
End: 2021-09-27
Payer: COMMERCIAL

## 2021-09-27 VITALS
HEIGHT: 64 IN | BODY MASS INDEX: 26.63 KG/M2 | DIASTOLIC BLOOD PRESSURE: 100 MMHG | HEART RATE: 76 BPM | WEIGHT: 156 LBS | SYSTOLIC BLOOD PRESSURE: 130 MMHG

## 2021-09-27 DIAGNOSIS — Z98.890 S/P LUMPECTOMY, RIGHT BREAST: Primary | ICD-10-CM

## 2021-09-27 PROCEDURE — 99024 POSTOP FOLLOW-UP VISIT: CPT | Performed by: SURGERY

## 2021-09-27 NOTE — PROGRESS NOTES
HISTORY OF PRESENT ILLNESS  Jailyn Galvez is a 64 y.o. female. HPI ESTABLISHED patient here today for post op follow up RIGHT lumpectomy and SLNB 9/14/21. The patient denies any swelling at either incisional sites. She denies any signs of infection and the incisions are dry and intact. She has multiple questions involving RTW and ability to swim and play tennis.       1.  Lymph node, right axillary sentinel #1, excision:        One lymph node with no evidence of metastatic carcinoma (0/1)     2.  Lymph node, right axillary sentinel #2, excision:        One lymph node with no evidence of metastatic carcinoma (0/1)     3.  Breast, right, lumpectomy:        Biopsy site reaction; no evidence of residual carcinoma   Please refer to synoptic report and comment below          SYNOPTIC REPORT - INVASIVE CARCINOMA OF THE BREAST: Resection    SPECIMEN       Procedure: Excision (less than total mastectomy)       Specimen Laterality: Right    TUMOR       Histologic Type: No residual invasive carcinoma       Ductal Carcinoma In Situ (DCIS): Not identified       Lobular Carcinoma In Situ (LCIS): Not identified    Tumor Extent       Treatment Effect in the Breast: No known presurgical therapy    MARGINS        Not applicable; no residual invasive carcinoma    LYMPH NODES       Regional Lymph Nodes: Uninvolved by tumor cells           Total Number of Lymph Nodes Examined: 2           Number of [place] Nodes Examined: 2    PATHOLOGIC STAGE CLASSIFICATION (pTNM, AJCC 8th Edition)       Primary Tumor (pT): pT0       Regional Lymph Nodes Modifier: (sn): [place] node(s) evaluated       Regional Lymph Nodes (pN): pN0    ADDITIONAL FINDINGS       Additional Findings: Biopsy site changes    SPECIAL STUDIES       Ancillary Testing Performed on Previous Biopsy (Outside Case   [id number], collected 8/10/21):           Estrogen Receptor (ER) Status: Positive (95%)           Progesterone Receptor (PgR) Status: Positive (30%)           OBM9 (by immunohistochemistry): Negative (Score 1+)           Ki-67 Percentage of Positive Nuclei: <5%           While definitive biopsy site changes are present, no residual carcinoma is   seen in the current specimen.  The entire biopsy cavity was submitted for   histologic review.       Review of scanned reports in the patient's electronic medical record show   mammography results indicating a small spiculated mass in the right breast   measuring 0.4 x 0.4 x 0.3 cm.  Subsequent core biopsy pathology [facility] HR:[id number], collected 8/10/21) showed invasive ductal   carcinoma, grade 1 with maximum size of 0.5 cm (5 mm).  Factors   contributing to the lack of residual carcinoma in the current sample   include removal of the majority/entire tumor by biopsy and/or post-biopsy   inflammation.  Please correlate with clinical and radiographic findings.                 Review of Systems   All other systems reviewed and are negative. Physical Exam  Vitals and nursing note reviewed. Chest:          Comments: Incisions healing well  Resolving ecchymosis        ASSESSMENT and PLAN    ICD-10-CM ICD-9-CM    1.  S/P lumpectomy, right breast  Z98.890 V45.89      - healing well  Refer to rad onc med onc  F/u in 4-6 months with np

## 2021-09-27 NOTE — LETTER
Clive Neely is requesting a 90 day supply    Last Visit: 5/10/19 with MD Eduardo Flores  Next Appointment: 9/13/19 with MD Eduardo Flores    Requested Prescriptions     Pending Prescriptions Disp Refills    hydroCHLOROthiazide (HYDRODIURIL) 25 mg tablet 90 Tab 3     Sig: Take 1 Tab by mouth daily. NOTIFICATION OF RETURN TO WORK / SCHOOL    9/27/2021    Ms. Rannie Dandy  [address]   [address] [address]      To Whom It May Concern:    Rannie Dandy was under the care of [facility]. She will return to work on Monday 10/4/21 without restrictions. If you have any further questions you can call us at the office.     [phone].                        ===================================================================        Nicole Johnson MD

## 2021-09-27 NOTE — PROGRESS NOTES
HISTORY OF PRESENT ILLNESS  Emilia Johnson is a 64 y.o. female.   HPI                                                                                                                                                                                                                                                                                                                                                                                                                                                                                                   ROS    Physical Exam    ASSESSMENT and PLAN  {ASSESSMENT/PLAN:27019}

## 2021-10-11 ENCOUNTER — TELEPHONE (OUTPATIENT)
Dept: SURGERY | Age: 61
End: 2021-10-11

## 2021-10-11 DIAGNOSIS — Z98.890 STATUS POST BREAST LUMPECTOMY: Primary | ICD-10-CM

## 2021-10-11 NOTE — TELEPHONE ENCOUNTER
Called and spoke to patient. Genetic testing results clinically negative with a VUS. Inquired about med onc and rad onc appointments. Will forward to scheduling to follow-up.

## 2021-10-19 NOTE — TELEPHONE ENCOUNTER
Called and left a message with the patient about her appointment with Dr Gaudencio Perez.   Dr Gaudencio Perez appointment is 10/21/2021 @ 4:00 pm   [phone]

## 2021-10-20 NOTE — PROGRESS NOTES
Briana Benitez is a 64 y.o. female here for new patient appt for right breast cancer. Referred by Dr Pelon Coto    1. Have you been to the ER, urgent care clinic since your last visit? Hospitalized since your last visit? New Pt    2. Have you seen or consulted any other health care providers outside of the [address] [place] [address] since your last visit? Include any pap smears or colon screening. New Pt    Pt here with  Suzette Wisdom. Pt had right breast lumpectomy on 9/14/21. Pt will have appt with DR Humberto Raines this afternoon.

## 2021-10-21 ENCOUNTER — DOCUMENTATION ONLY (OUTPATIENT)
Dept: ONCOLOGY | Age: 61
End: 2021-10-21

## 2021-10-21 ENCOUNTER — OFFICE VISIT (OUTPATIENT)
Dept: ONCOLOGY | Age: 61
End: 2021-10-21
Payer: COMMERCIAL

## 2021-10-21 ENCOUNTER — TELEPHONE (OUTPATIENT)
Dept: ONCOLOGY | Age: 61
End: 2021-10-21

## 2021-10-21 VITALS
SYSTOLIC BLOOD PRESSURE: 128 MMHG | DIASTOLIC BLOOD PRESSURE: 88 MMHG | HEIGHT: 64 IN | WEIGHT: 149.4 LBS | BODY MASS INDEX: 25.51 KG/M2 | OXYGEN SATURATION: 94 % | TEMPERATURE: 98.3 F | HEART RATE: 78 BPM

## 2021-10-21 DIAGNOSIS — C50.411 MALIGNANT NEOPLASM OF UPPER-OUTER QUADRANT OF RIGHT BREAST IN FEMALE, ESTROGEN RECEPTOR POSITIVE (HCC): Primary | ICD-10-CM

## 2021-10-21 DIAGNOSIS — Z17.0 MALIGNANT NEOPLASM OF UPPER-OUTER QUADRANT OF RIGHT BREAST IN FEMALE, ESTROGEN RECEPTOR POSITIVE (HCC): Primary | ICD-10-CM

## 2021-10-21 PROCEDURE — 99245 OFF/OP CONSLTJ NEW/EST HI 55: CPT | Performed by: INTERNAL MEDICINE

## 2021-10-21 NOTE — LETTER
10/21/2021 Patient: Guille Barker YOB: 1960 Date of Visit: 10/21/2021 Oneyda Guallpa MD 
[address] [place] [address] [address] [address] [address] Fax: [phone] Armando Garcia MD 
[address] [address] [address] [address] [address] In [facility] [address] Dear MD Armando Ricardo MD, Thank you for referring Ms. Michael Hendrickson to [address] [place] [address] for evaluation. My notes for this consultation are attached. If you have questions, please do not hesitate to call me. I look forward to following your patient along with you.  
 
 
Sincerely, 
 
Eliza Gill MD

## 2021-10-21 NOTE — PROGRESS NOTES
Oncology Social Work  Psychosocial Assessment    Reason for Assessment:      [] Social Work Referral [x] Initial Assessment [x] New Diagnosis [] Other    [facility] [address] 2021   Confirm Advance Directive None   Patient Would Like to Complete Advance Directive No   Does the patient have other document types Organ Directive       Sources of Information:    [x]Patient  []Family  [x]Staff  [x]Medical Record    Mental Status:    []Alert  []Lethargic  []Unresponsive   [] Unable to assess   Oriented to:  [x]Person  [x]Place  [x]Time  [x]Situation      Relationship Status:  []Single  [x]  []Significant Other/Life Partner  []  []  []    Living Circumstances:  []Lives Alone  [x]Family/Significant Other in Household  []Roommates  []Children in the Home  []Paid Caregivers  []Assisted Living Facility/Group [address]  []Homeless  []Incarcerated  []Environmental/Care Concerns  []Other:    Employment Status:  [x]Employed Full-time []Employed Part-time []Homemaker  [] Retired [] Short-Term Disability [] [facility]  [] Unemployed     Barriers to Learning:    []Language  []Developmental  []Cognitive  []Altered Mental Status  []Visual/Hearing Impairment  []Unable to Read/Write  []Motivational  [] Challenges Understanding Medical Jargon [x]No Barriers Identified      Financial/Legal Concerns:    []Uninsured  []Limited Income/Resources  []Non-Citizen  []Food Insecurity [x]No Concerns Identified   []Other:    Anabaptism/Spiritual/Existential:  Does patient have any spiritual or Anglican beliefs? [x] Yes [] No  Is the patient involved in a spiritual, oneil or Anglican community?  [x] Yes [] No  Patient expressing spiritual/existential angst? [] Yes [x] No  Notes:    Support System:    Identified Support Person/Group:  []Strong  [x]Fair  []Limited    Coping with Illness:   [x]  Coping Well  [] Challenges Coping with Serious Illness [] Situational Depression [] Situational Anxiety [] Anticipatory Grief  [] Recent Loss [] Caregiver [place]            Narrative:   Met with patient to introduce social work navigator role and supports. 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   PT spouse Gemini Madsen had TBI and spoke about support groups that he attended. Plan:   1. Introduce self and role of the  in the [facility]. 2. Informed the patient of the [facility] and available resources there. 3. Continue to meet with the patient when she returns to the clinic for ongoing assessment of the patient's adjustment to her diagnosis and treatment. 4. Ongoing psychosocial support as desired by patient. Referral:   Complementary therapies referral      Omar Bryant.  Madan Can, JESS/JESSI  Supervisee in Social Work

## 2021-10-21 NOTE — PROGRESS NOTES
[address] [facility] [address], [address] [facility] [place], [address] [place] [address]  [phone]        Oncology Consultation Note        Patient: Marilee Hammonds MRN: [id number]  SSN: [id number]    YOB: 1960  Age: 64 y.o. Sex: female      Subjective:      Marilee Hammonds is a 64 y.o. female who I am seeing for a new diagnosis of right sided invasive breast carcinoma on request of Dr. Tonya Desai. She underwent a routine screening mammogram and was noted to have an abnormal mammogram. A biopsy of the right breast revealed IDC Gr 1 ER 95%, FL 30% and Her 2 -ve. She underwent a right sided lumpectomy on 09/14/2021. She comes in to discus the next steps. Review of Systems:    Constitutional: negative  Eyes: negative  Ears, Nose, Mouth, Throat, and Face: negative  Respiratory: negative  Cardiovascular: negative  Gastrointestinal: negative  Genitourinary:negative  Integument/Breast: negative  Hematologic/Lymphatic: negative  Musculoskeletal:negative  Neurological: negative        Past Medical History:   Diagnosis Date    Cancer ([address]     RIGHT BREAST    Chronic pain     RIGHT HIP, BUTTUCK DOWN LEG. \"I WAS TOLD IT WAS NERVE PAIN\"    Hypertension     CONTROLLED WITH MEDS    Nausea & vomiting     ONLY WHEN SHE RECEIVES PERCOCET. HOLD MED, IF PRESCRIBED.  Psychiatric disorder     Thyroid disease      Past Surgical History:   Procedure Laterality Date    HX BREAST LUMPECTOMY Right 9/14/2021    RIGHT BREAST LUMPECTOMY WITH ULTRASOUND performed by Cyndy Ward MD at [facility] AMBULATORY OR      Family History   Problem Relation Age of Onset    Breast Cancer Mother 72    Prostate Cancer Father      Social History     Tobacco Use    Smoking status: Never Smoker    Smokeless tobacco: Never Used   Substance Use Topics    Alcohol use:  Yes     Alcohol/week: 3.0 standard drinks     Types: 3 Shots of liquor per week      Prior to Admission medications    Medication Sig Start Date End Date Taking? Authorizing Provider   multivitamin (ONE A DAY) tablet Take 1 Tablet by mouth daily. Yes Provider, Historical   cholecalciferol (Vitamin D3) (5000 Units/125 mcg) tab tablet Take 5,000 Units by mouth daily. Yes Provider, Historical   hydroCHLOROthiazide (HYDRODIURIL) 25 mg tablet Take 25 mg by mouth daily. 8/24/21  Yes Provider, Historical   Euthyrox 100 mcg tablet TAKE 1 TABLET BY MOUTH ONCE DAILY FOR 90 DAYS 7/3/21  Yes Provider, Historical   methylphenidate HCl (RITALIN) 20 mg tablet TAKE 1 TABLET BY MOUTH THREE TIMES DAILY ON AN EMPTY STOMACH FOR 90 DAYS 6/23/21  Yes Provider, Historical   fluticasone propionate (Children's Flonase Allergy Rlf) 50 mcg/actuation nasal spray 2 Sprays by Both Nostrils route two (2) times a day. Yes Provider, Historical   fexofenadine-pseudoephedrine (Allegra-D 12 Hour)  mg Tb12 Take 1 Tablet by mouth daily (with breakfast). Yes Provider, Historical   OTHER Allergy Injections [id number]   Yes Provider, Historical   ALPRAZolam (XANAX) 0.5 mg tablet Take 1 Tablet by mouth three (3) times daily as needed for Anxiety. Max Daily Amount: 1.5 mg. 8/30/21  Yes Phyllis Randall MD   ondansetron (ZOFRAN ODT) 4 mg disintegrating tablet Take 1 Tablet by mouth every eight (8) hours as needed for Nausea or Vomiting. Patient not taking: Reported on 10/21/2021 9/14/21   Phyllis Randall MD   naloxone [facility]) 4 mg/actuation nasal spray Use 1 spray intranasally, then discard. Repeat with new spray every 2 min as needed for opioid overdose symptoms, alternating nostrils.   Patient not taking: Reported on 10/21/2021 9/14/21   Phyllis Randall MD              Allergies   Allergen Reactions   Annisaak Mcguirey Ext-American Cockroach Itching, Cough and Sneezing    Allerg Ext-Tall Ragweed Pollen Itching    Dog Dander Itching, Cough and Sneezing    Grass Pollen Itching and Sneezing    Mold Shortness of Breath, Itching and Cough    Percocet [Oxycodone-Acetaminophen] Nausea and Vomiting    Tree And Shrub Pollen Shortness of Breath, Itching and Sneezing    Weed Pollen-Short Ragweed Shortness of Breath, Itching, Cough and Sneezing    Levaquin [Levofloxacin] Other (comments)     PT STATES SHE WOULD NOT LIKE THIS MEDICATION AS, \"2  PEOPLE I KNOW HAVE HAD VERY BAD REACTIONS. DONT WANT TO TAKE A CHANCE\"           Objective:     Vitals:    10/21/21 1033   BP: 128/88   Pulse: 78   Temp: 98.3 °F (36.8 °C)   TempSrc: Temporal   SpO2: 94%   Weight: 149 lb 6.4 oz (67.8 kg)   Height: 5' 4\" (1.626 m)            Physical Exam:  GENERAL: alert, cooperative, no distress, appears stated age  EYE: conjunctivae/corneas clear. PERRL, EOM's intact  LYMPHATIC: Cervical, supraclavicular, and axillary nodes normal.   THROAT & NECK: normal and no erythema or exudates noted. LUNG: clear to auscultation bilaterally  HEART: regular rate and rhythm, S1, S2 normal, no murmur, click, rub or gallop  ABDOMEN: soft, non-tender. Bowel sounds normal. No masses,  no organomegaly  EXTREMITIES:  extremities normal, atraumatic, no cyanosis or edema  SKIN: Normal.  NEUROLOGIC: AOx3. Gait normal. Reflexes and motor strength normal and symmetric. Cranial nerves 2-12 and sensation grossly intact. Assessment:     1. Right breast carcinoma:  T1a N0 M0 (Stage I) infiltrating ductal carcinoma, Tumor size < 5 mm, LN -ve, grade 1, ER 95%, GA 30%, Her 2 -ve     ECOG PS 0  Intent of Treatment - curative  Prognosis - excellent    S/P right breast lumpectomy 09/14/2021    Patient sent for consideration of adjuvant therapy. I spent significant time in explaining the rationale of adjuvant therapy is to reduce the risk of recurrence and improve the chances of cure. The risk of recurrence in the next 5 years is around 10-15%. Since the tumor is small, LN -ve and highly ER+ve, she would not benefit from adjuvant chemotherapy and thus I did not offer her this therapy.  She will however benefit from adjuvant hormonal therapy. Adjuvant Aromatase inhibitor would reduce the risk of recurrence by 50% on an average. I counseled the patient regarding the hormonal therapy. Discussions included side-effect and benefit of hormonal therapy. She understood the expected side-effect which includes hot flashes, myalgias/arthralgias, osteopenia/osteoporosis with aromatase inhibitor. She agrees to take Letrozole. She understands the alternative to this is observation alone. I spent 90 minutes with the patient in a face-to-face encounter. I explained her the stage of the disease, pathophysiology of the disease and the treatment approaches. I answered all her questions. More than 50% of the time was utilized in education, counseling and co-ordination of care. The patient's emotional well being was addressed during this office visit and patient seems to be coping well with the diagnosis and the treatment. Plan:       1. Complete adjuvant radiation to the right breast  2. Start Letrozole after completion of adjuvant radiation. 3. Return in 3 months      Signed By: Krzysztof Cordova MD     October 21, 2021         CC. Aditi Braxton MD  CC.  Darlene Sexton MD

## 2021-10-29 ENCOUNTER — NURSE NAVIGATOR (OUTPATIENT)
Dept: CASE MANAGEMENT | Age: 61
End: 2021-10-29

## 2021-10-29 NOTE — PROGRESS NOTES
[address] [facility]   Breast Navigator Encounter    Name:    Daisha Preston  Age:    64 y.o. Diagnosis:    RIGHT breast cancer    Interdisciplinary Team:  Med-Onc:    Dr. Hodan Ricardo  Surg-Onc:    Dr. Aris Wyatt:    Dr. Kayce East:    :    Shelli Box LCSW  Nurse Navigator:  Laila Kraft RN, BSN, [address] [facility]      Encounter type:  []Patient Initiated  [x]Navigator Follow-up []Pre-op  []Post-op  []Check-in Prior to First Treatment [x]Treatment Modality Change  []Survivorship Transition []Other:       Narrative:    Checked in with patient. Due to start XRT next week with Dr. Keysha Wilhelm. Had no concerns today other than her insurance coverage, which [facility] promises will not be an issue. Saw Dr. Hodan Ricardo and plan is to start the AI after radiation. Overall doing well, appreciative of the care she has received.             Laila Kraft RN, BSN, [address] [facility]  Oncology Breast Navigator     [address] [place] [address]   [address] [facility] [address].  W: [phone]  F: [phone]  [email or web address]  Good Help to Those in [address] [place] [address]

## 2022-01-20 NOTE — PROGRESS NOTES
Charla Hashimoto is a 64 y.o. female here for 3 month follow up for right breast cancer. Pt finished radiation Dec 2, 2021. She has not started Letrozole yet. She is seeing chiropractor for her sciatica and hoping not to need back injections. 1. Have you been to the ER, urgent care clinic since your last visit? Hospitalized since your last visit? no    2. Have you seen or consulted any other health care providers outside of the [address] [place] [address] since your last visit? Include any pap smears or colon screening.  Radiation and chiropractor and Dr Arminda Napier

## 2022-01-21 ENCOUNTER — OFFICE VISIT (OUTPATIENT)
Dept: ONCOLOGY | Age: 62
End: 2022-01-21
Payer: COMMERCIAL

## 2022-01-21 VITALS
TEMPERATURE: 98.5 F | SYSTOLIC BLOOD PRESSURE: 126 MMHG | HEIGHT: 64 IN | HEART RATE: 80 BPM | WEIGHT: 153.4 LBS | DIASTOLIC BLOOD PRESSURE: 80 MMHG | OXYGEN SATURATION: 97 % | BODY MASS INDEX: 26.19 KG/M2

## 2022-01-21 DIAGNOSIS — C50.411 MALIGNANT NEOPLASM OF UPPER-OUTER QUADRANT OF RIGHT BREAST IN FEMALE, ESTROGEN RECEPTOR POSITIVE (HCC): Primary | ICD-10-CM

## 2022-01-21 DIAGNOSIS — Z17.0 MALIGNANT NEOPLASM OF UPPER-OUTER QUADRANT OF RIGHT BREAST IN FEMALE, ESTROGEN RECEPTOR POSITIVE (HCC): Primary | ICD-10-CM

## 2022-01-21 PROCEDURE — 99213 OFFICE O/P EST LOW 20 MIN: CPT | Performed by: INTERNAL MEDICINE

## 2022-01-21 RX ORDER — LETROZOLE 2.5 MG/1
2.5 TABLET, FILM COATED ORAL DAILY
Qty: 90 TABLET | Refills: 3 | Status: SHIPPED | OUTPATIENT
Start: 2022-01-21

## 2022-01-21 RX ORDER — DICLOFENAC SODIUM 75 MG/1
75 TABLET, DELAYED RELEASE ORAL 2 TIMES DAILY
COMMUNITY
Start: 2022-01-06

## 2022-01-21 RX ORDER — PREGABALIN 75 MG/1
75 CAPSULE ORAL 2 TIMES DAILY
COMMUNITY
Start: 2021-12-23 | End: 2022-01-22

## 2022-01-21 NOTE — PROGRESS NOTES
[address] [facility] [address], [address] [facility], [address] [place] [address], [address] [place] [address]  [phone]    Follow-up Note      Patient: Swetha Villa MRN: [id number]  SSN: [id number]    YOB: 1960  Age: 64 y.o. Sex: female      Subjective:      Swetha Villa is a 64 y.o. female who I am seeing for a new diagnosis of right sided invasive breast carcinoma on request of Dr. Roma Rain. She underwent a routine screening mammogram and was noted to have an abnormal mammogram. A biopsy of the right breast revealed IDC Gr 1 ER 95%, KS 30% and Her 2 -ve. She underwent a right sided lumpectomy on 09/14/2021. She completed adjuvant radiation on 12/2/2021 and is here today with her  for follow-up. She is feeling well with no new complaints. Review of Systems:    Constitutional: negative  Eyes: negative  Ears, Nose, Mouth, Throat, and Face: negative  Respiratory: negative  Cardiovascular: negative  Gastrointestinal: negative  Genitourinary:negative  Integument/Breast: negative  Hematologic/Lymphatic: negative  Musculoskeletal:negative  Neurological: negative    Review of systems was reviewed and updated as needed on 01/21/22. Past Medical History:   Diagnosis Date    Cancer ([address]     RIGHT BREAST    Chronic pain     RIGHT HIP, BUTTUCK DOWN LEG. \"I WAS TOLD IT WAS NERVE PAIN\"    Hypertension     CONTROLLED WITH MEDS    Nausea & vomiting     ONLY WHEN SHE RECEIVES PERCOCET. HOLD MED, IF PRESCRIBED.     Psychiatric disorder     Thyroid disease      Past Surgical History:   Procedure Laterality Date    HX BREAST LUMPECTOMY Right 9/14/2021    RIGHT BREAST LUMPECTOMY WITH ULTRASOUND performed by Kenneth Mane MD at [facility]      Family History   Problem Relation Age of Onset    Breast Cancer Mother 72    Prostate Cancer Father      Social History     Tobacco Use    Smoking status: Never Smoker    Smokeless tobacco: Never Used Substance Use Topics    Alcohol use: Yes     Alcohol/week: 3.0 standard drinks     Types: 3 Shots of liquor per week      Prior to Admission medications    Medication Sig Start Date End Date Taking? Authorizing Provider   diclofenac EC (VOLTAREN) 75 mg EC tablet Take 75 mg by mouth two (2) times a day. 1/6/22  Yes Provider, Historical   pregabalin (LYRICA) 75 mg capsule Take 75 mg by mouth two (2) times a day. 12/23/21 1/22/22 Yes Provider, Historical   letrozole (FEMARA) 2.5 mg tablet Take 1 Tablet by mouth daily. 1/21/22  Yes Susan Adair MD   multivitamin (ONE A DAY) tablet Take 1 Tablet by mouth daily. Yes Provider, Historical   cholecalciferol (Vitamin D3) (5000 Units/125 mcg) tab tablet Take 5,000 Units by mouth daily. Yes Provider, Historical   hydroCHLOROthiazide (HYDRODIURIL) 25 mg tablet Take 25 mg by mouth daily. 8/24/21  Yes Provider, Historical   Euthyrox 100 mcg tablet TAKE 1 TABLET BY MOUTH ONCE DAILY FOR 90 DAYS 7/3/21  Yes Provider, Historical   methylphenidate HCl (RITALIN) 20 mg tablet TAKE 1 TABLET BY MOUTH THREE TIMES DAILY ON AN EMPTY STOMACH FOR 90 DAYS 6/23/21  Yes Provider, Historical   fluticasone propionate (Children's Flonase Allergy Rlf) 50 mcg/actuation nasal spray 2 Sprays by Both Nostrils route two (2) times a day. Yes Provider, Historical   fexofenadine-pseudoephedrine (Allegra-D 12 Hour)  mg Tb12 Take 1 Tablet by mouth daily (with breakfast). Yes Provider, Historical   OTHER Allergy Injections [id number]   Yes Provider, Historical   ALPRAZolam (XANAX) 0.5 mg tablet Take 1 Tablet by mouth three (3) times daily as needed for Anxiety.  Max Daily Amount: 1.5 mg. 8/30/21  Yes Forde Osgood, MD              Allergies   Allergen Reactions    Aller Ext-American Cockroach Itching, Cough and Sneezing    Allerg Ext-Tall Ragweed Pollen Itching    Dog Dander Itching, Cough and Sneezing    Grass Pollen Itching and Sneezing    Mold Shortness of Breath, Itching and Cough    Percocet [Oxycodone-Acetaminophen] Nausea and Vomiting    Tree And Shrub Pollen Shortness of Breath, Itching and Sneezing    Weed Pollen-Short Ragweed Shortness of Breath, Itching, Cough and Sneezing    Levaquin [Levofloxacin] Other (comments)     PT STATES SHE WOULD NOT LIKE THIS MEDICATION AS, \"2  PEOPLE I KNOW HAVE HAD VERY BAD REACTIONS. DONT WANT TO TAKE A CHANCE\"           Objective:     Vitals:    01/21/22 1022   BP: 126/80   Pulse: 80   Temp: 98.5 °F (36.9 °C)   TempSrc: Temporal   SpO2: 97%   Weight: 153 lb 6.4 oz (69.6 kg)   Height: 5' 4\" (1.626 m)      Pain Scale: 0 - No pain/10      Physical Exam:  GENERAL: alert, cooperative, no distress, appears stated age  EYE: conjunctivae/corneas clear. t  LYMPHATIC: Cervical, supraclavicular, and axillary nodes normal.   THROAT & NECK: normal and no erythema or exudates noted. LUNG: clear to auscultation bilaterally  HEART: regular rate and rhythm   ABDOMEN: soft, non-tender. EXTREMITIES:  extremities normal, atraumatic, no cyanosis or edema  SKIN: Normal.  NEUROLOGIC: AOx3. Gait normal.     The above physical exam was reviewed and updated as needed on 01/21/22. Assessment:     1. Right breast carcinoma:  T1a N0 M0 (Stage I) infiltrating ductal carcinoma, Tumor size < 5 mm, LN -ve, grade 1, ER 95%, GA 30%, Her 2 -ve     ECOG PS 0  Intent of Treatment - curative  Prognosis - excellent    S/P right breast lumpectomy 09/14/2021  S/p adjuvant right breast radiation - completed 12/2/2021    Patient sent for consideration of adjuvant therapy. I spent significant time in explaining the rationale of adjuvant therapy is to reduce the risk of recurrence and improve the chances of cure. The risk of recurrence in the next 5 years is around 10-15%. Since the tumor is small, LN -ve and highly ER+ve, she would not benefit from adjuvant chemotherapy and thus I did not offer her this therapy. She will however benefit from adjuvant hormonal therapy.  Adjuvant Aromatase inhibitor would reduce the risk of recurrence by 50% on an average. I counseled the patient regarding the hormonal therapy. Discussions included side-effect and benefit of hormonal therapy. She understood the expected side-effect which includes hot flashes, myalgias/arthralgias, osteopenia/osteoporosis with aromatase inhibitor. She agrees to take Letrozole. She understands the alternative to this is observation alone. Start Letrozole 2.5 mg po daily      Plan:     > Start Letrozole  > Follow-up in 3 months      Signed By: Jessica Bridges NP     January 21, 2022         I personally saw and evaluated the patient and performed the key components of medical decision making. The history, physical exam, and documentation were performed by Carolyn Batista NP. I reviewed and verified the above documentation and modified it as needed. Signed by: Delmi Chaudhry MD                     January 21, 2022      CC. Romulo Reza MD  CC.  Pastor Elisha MD

## 2022-01-21 NOTE — LETTER
1/21/2022    Patient: Nikita Jones   YOB: 1960   Date of Visit: 1/21/2022     Saira Mario MD  [address] [place] [address]  [address] [address]  Via Fax: [phone]    Dear Saira Mario MD,      Thank you for referring Ms. Jhonatan Sosa to [address] [place] [address] for evaluation. My notes for this consultation are attached. If you have questions, please do not hesitate to call me. I look forward to following your patient along with you.       Sincerely,    Evan David MD

## 2022-03-22 ENCOUNTER — OFFICE VISIT (OUTPATIENT)
Dept: SURGERY | Age: 62
End: 2022-03-22
Payer: COMMERCIAL

## 2022-03-22 VITALS
DIASTOLIC BLOOD PRESSURE: 82 MMHG | BODY MASS INDEX: 26.12 KG/M2 | HEIGHT: 64 IN | HEART RATE: 81 BPM | SYSTOLIC BLOOD PRESSURE: 121 MMHG | WEIGHT: 153 LBS

## 2022-03-22 DIAGNOSIS — Z17.0 MALIGNANT NEOPLASM OF UPPER-OUTER QUADRANT OF RIGHT BREAST IN FEMALE, ESTROGEN RECEPTOR POSITIVE (HCC): Primary | ICD-10-CM

## 2022-03-22 DIAGNOSIS — Z98.890 S/P LUMPECTOMY, RIGHT BREAST: ICD-10-CM

## 2022-03-22 DIAGNOSIS — C50.411 MALIGNANT NEOPLASM OF UPPER-OUTER QUADRANT OF RIGHT BREAST IN FEMALE, ESTROGEN RECEPTOR POSITIVE (HCC): Primary | ICD-10-CM

## 2022-03-22 DIAGNOSIS — Z92.3 S/P RADIATION THERAPY: ICD-10-CM

## 2022-03-22 DIAGNOSIS — Z85.3 HISTORY OF BREAST CANCER IN FEMALE: ICD-10-CM

## 2022-03-22 PROCEDURE — 99213 OFFICE O/P EST LOW 20 MIN: CPT | Performed by: NURSE PRACTITIONER

## 2022-03-22 NOTE — PATIENT INSTRUCTIONS

## 2022-03-22 NOTE — PROGRESS NOTES
HISTORY OF PRESENT ILLNESS  Taisha Trejo is a 64 y.o. female. HPI Established patient presents for follow-up for RIGHT breast cancer. Denies breast mass, skin changes, nipple discharge and pain. Reports some soreness to the RIGHT breast.        Breast history -   Referring - unknown  21 - screening mammogram: BI-RADS 0  8/10/21 - RIGHT breast diagnostic mammogram done and breast US done at [facility]: BI-RADS 4 - 4 mm mass 12:00 right breast   8/10/21 - RIGHT breast 12 OC Biopsy - IDC, grade 1, ER positive 95%, NH positive 30%, HER2 negative. 21 - breast MRI done at [facility]  21 - RIGHT breast lumpectomy and SLNB - Dr. Lisbeth Chang  No residual tumor in breast, 2 lymph nodes negative - T0N0 on surgery  Overall - T1aN0  21 - completed XRT - Dr. Vale Sommers   2022 - start letrozole - Dr. Dioni Ramírez       Family history -  Mother diagnosed with breast cancer at age 72. . Father had prostate cancer.  - genetic testing done - clinically negative with VUS      OB History    No obstetric history on file. Obstetric Comments   Menarche 15, LMP , # of children 2, age of 4st delivery 25, Hysterectomy/oophorectomy /NO/NO, Breast bx NO, history of breast feeding NO, BCP YES, Hormone therapy YES               Past Surgical History:   Procedure Laterality Date    HX BREAST LUMPECTOMY Right 2021    RIGHT BREAST LUMPECTOMY WITH ULTRASOUND performed by Emma Bardales MD at [facility]    Physical Exam  Constitutional:       Appearance: Normal appearance. Chest:   Breasts:      Right: No mass, nipple discharge, skin change, tenderness, axillary adenopathy or supraclavicular adenopathy. Left: No mass, nipple discharge, skin change, tenderness, axillary adenopathy or supraclavicular adenopathy. Musculoskeletal:      Comments: FROM - UE x 2   Lymphadenopathy:      Upper Body:      Right upper body: No supraclavicular or axillary adenopathy. Left upper body: No supraclavicular or axillary adenopathy. Neurological:      Mental Status: She is alert. Psychiatric:         Attention and Perception: Attention normal.         Mood and Affect: Mood normal.         Speech: Speech normal.         Behavior: Behavior normal.         Visit Vitals  /82 (BP 1 Location: Left upper arm, BP Patient Position: Sitting, BP Cuff Size: Adult)   Pulse 81   Ht 5' 4\" (1.626 m)   Wt 153 lb (69.4 kg)   BMI 26.26 kg/m²       ASSESSMENT and PLAN    ICD-10-CM ICD-9-CM    1. Malignant neoplasm of upper-outer quadrant of right breast in female, estrogen receptor positive (HCC)  C50.411 174.4     Z17.0 V86.0    2. S/P lumpectomy, right breast  Z98.890 V45.89    3. S/P radiation therapy  Z92.3 V66.1    4. History of breast cancer in female  Z85.3 V10.3       Normal exam with no evidence of local recurrence. BDmammogram 3D in 5/2022 - already scheduled. Will have at [facility] where she has had mammograms in the past.  Asked about a DEXA scan which Dr. Morenita Arthur recommended. She will call [facility] and see if this can be added to her mammogram appointment. Reports some joint pain on the AI. Will follow-up with Dr. Morenita Arthur if this persists. Has routine follow-up scheduled. Has follow-up with Dr. Danica Armenta. RTC in 6 months or sooner PRN. She is comfortable with this plan. All questions answered and she stated understanding. Total time spent for this patient - 20 minutes.

## 2022-04-07 NOTE — PROGRESS NOTES
Kathryn Rico is a 64 y.o. female here for follow up for right breast cancer. She started Letrozole at last visit. She has been feeling a lot of tightness in her right breast area. She has been to PT for her back and using meds for back pain. She had steroid epidural in February and also had Cortizone shot recently in her back. She is having terrible hot flashes a few times per day. Sometime has to change clothes at night. 1. Have you been to the ER, urgent care clinic since your last visit? Hospitalized since your last visit? no    2. Have you seen or consulted any other health care providers outside of the [address] [place] [address] since your last visit? Include any pap smears or colon screening.  PT, [facility]

## 2022-04-11 ENCOUNTER — OFFICE VISIT (OUTPATIENT)
Dept: ONCOLOGY | Age: 62
End: 2022-04-11
Payer: COMMERCIAL

## 2022-04-11 VITALS
TEMPERATURE: 98.5 F | SYSTOLIC BLOOD PRESSURE: 134 MMHG | HEART RATE: 78 BPM | OXYGEN SATURATION: 98 % | BODY MASS INDEX: 26.15 KG/M2 | DIASTOLIC BLOOD PRESSURE: 85 MMHG | WEIGHT: 153.2 LBS | HEIGHT: 64 IN

## 2022-04-11 DIAGNOSIS — C50.411 MALIGNANT NEOPLASM OF UPPER-OUTER QUADRANT OF RIGHT BREAST IN FEMALE, ESTROGEN RECEPTOR POSITIVE (HCC): Primary | ICD-10-CM

## 2022-04-11 DIAGNOSIS — Z17.0 MALIGNANT NEOPLASM OF UPPER-OUTER QUADRANT OF RIGHT BREAST IN FEMALE, ESTROGEN RECEPTOR POSITIVE (HCC): Primary | ICD-10-CM

## 2022-04-11 PROCEDURE — 99213 OFFICE O/P EST LOW 20 MIN: CPT | Performed by: INTERNAL MEDICINE

## 2022-04-11 RX ORDER — PREGABALIN 75 MG/1
75 CAPSULE ORAL 2 TIMES DAILY
COMMUNITY
Start: 2022-02-21

## 2022-04-11 NOTE — PATIENT INSTRUCTIONS
Please get your DEXA scan done and faxed to us at [phone]. Black Cohosh is an helpful over the counter medication you can take for hot flashes. [employer], [employer] [address], etc. Should have this on the shelves. Thanks!

## 2022-04-11 NOTE — PROGRESS NOTES
[address] [facility] [address], 210 [facility], 45 [facility], [address] [place] [address]  [phone]    Follow-up Note      Patient: Gerber Romero MRN: [id number]  SSN: [id number]    YOB: 1960  Age: 64 y.o. Sex: female        Diagnosis:     1. Right breast carcinoma: Dx: 8/2021  T1a N0 M0 (Stage I) infiltrating ductal carcinoma, Tumor size < 5 mm, LN -ve, grade 1, ER 95%, NH 30%, Her 2 -ve     Treatment:     1. Right breast lumpectomy with SLNB - 9/14/2021  2. Completed XRT - DR. James 12/2/2022  3. Started Letrozole - 1/2022    Subjective:      Gerber Romero is a 64 y.o. female who I am seeing for a new diagnosis of right sided invasive breast carcinoma on request of Dr. Stew Reese. She underwent a routine screening mammogram and was noted to have an abnormal mammogram. A biopsy of the right breast revealed IDC Gr 1 ER 95%, NH 30% and Her 2 -ve. She underwent a right sided lumpectomy on 09/14/2021. She completed adjuvant radiation on 12/2/2021. Ms. Jose Miguel Watts reports having hot flashes since starting Letrozole in January. She had a follow up with Madi Quintero NP on 3/22. Review of Systems:    Constitutional: Hot flashes  Eyes: negative  Ears, Nose, Mouth, Throat, and Face: negative  Respiratory: negative  Cardiovascular: negative  Gastrointestinal: negative  Genitourinary:negative  Integument/Breast: negative  Hematologic/Lymphatic: negative  Musculoskeletal:negative  Neurological: negative    Review of systems was reviewed and updated as needed on 04/11/22. Past Medical History:   Diagnosis Date    Cancer ([address]     RIGHT BREAST    Chronic pain     RIGHT HIP, BUTTUCK DOWN LEG. \"I WAS TOLD IT WAS NERVE PAIN\"    Hypertension     CONTROLLED WITH MEDS    Nausea & vomiting     ONLY WHEN SHE RECEIVES PERCOCET. HOLD MED, IF PRESCRIBED.     Psychiatric disorder     Thyroid disease      Past Surgical History:   Procedure Laterality Date  HX BREAST LUMPECTOMY Right 9/14/2021    RIGHT BREAST LUMPECTOMY WITH ULTRASOUND performed by Melissa John MD at [facility] AMBULATORY OR      Family History   Problem Relation Age of Onset    Breast Cancer Mother 72    Prostate Cancer Father      Social History     Tobacco Use    Smoking status: Never Smoker    Smokeless tobacco: Never Used   Substance Use Topics    Alcohol use: Yes     Alcohol/week: 3.0 standard drinks     Types: 3 Shots of liquor per week      Prior to Admission medications    Medication Sig Start Date End Date Taking? Authorizing Provider   pregabalin (LYRICA) 75 mg capsule Take 75 mg by mouth two (2) times a day. 2/21/22  Yes Provider, Historical   fexofenadine HCl (ALLEGRA PO) Take  by mouth. Yes Provider, Historical   diclofenac EC (VOLTAREN) 75 mg EC tablet Take 75 mg by mouth two (2) times a day. 1/6/22  Yes Provider, Historical   letrozole (FEMARA) 2.5 mg tablet Take 1 Tablet by mouth daily. 1/21/22  Yes Vito Carmichael MD   multivitamin (ONE A DAY) tablet Take 1 Tablet by mouth daily. Yes Provider, Historical   cholecalciferol (Vitamin D3) (5000 Units/125 mcg) tab tablet Take 5,000 Units by mouth daily. Yes Provider, Historical   hydroCHLOROthiazide (HYDRODIURIL) 25 mg tablet Take 25 mg by mouth daily. 8/24/21  Yes Provider, Historical   Euthyrox 100 mcg tablet TAKE 1 TABLET BY MOUTH ONCE DAILY FOR 90 DAYS 7/3/21  Yes Provider, Historical   methylphenidate HCl (RITALIN) 20 mg tablet TAKE 1 TABLET BY MOUTH THREE TIMES DAILY ON AN EMPTY STOMACH FOR 90 DAYS 6/23/21  Yes Provider, Historical   fluticasone propionate (Children's Flonase Allergy Rlf) 50 mcg/actuation nasal spray 2 Sprays by Both Nostrils route two (2) times a day. Yes Provider, Historical   fexofenadine-pseudoephedrine (Allegra-D 12 Hour)  mg Tb12 Take 1 Tablet by mouth daily (with breakfast).    Yes Provider, Historical   OTHER Allergy Injections [id number]   Yes Provider, Historical   ALPRAZolam (XANAX) 0.5 mg tablet Take 1 Tablet by mouth three (3) times daily as needed for Anxiety. Max Daily Amount: 1.5 mg. Patient not taking: Reported on 4/11/2022 8/30/21   Venecia Meza MD              Allergies   Allergen Reactions   Souleymane Fox Ext-American Cockroach Itching, Cough and Sneezing    Allerg Ext-Tall Ragweed Pollen Itching    Dog Dander Itching, Cough and Sneezing    Grass Pollen Itching and Sneezing    Mold Shortness of Breath, Itching and Cough    Percocet [Oxycodone-Acetaminophen] Nausea and Vomiting    Tree And Shrub Pollen Shortness of Breath, Itching and Sneezing    Weed Pollen-Short Ragweed Shortness of Breath, Itching, Cough and Sneezing    Levaquin [Levofloxacin] Other (comments)     PT STATES SHE WOULD NOT LIKE THIS MEDICATION AS, \"2  PEOPLE I KNOW HAVE HAD VERY BAD REACTIONS. DONT WANT TO TAKE A CHANCE\"           Objective:     Vitals:    04/11/22 0910   BP: 134/85   Pulse: 78   Temp: 98.5 °F (36.9 °C)   TempSrc: Temporal   SpO2: 98%   Weight: 153 lb 3.2 oz (69.5 kg)   Height: 5' 4\" (1.626 m)      Pain Scale: 0 - No pain/10      Physical Exam:  GENERAL: alert, cooperative, no distress, appears stated age  EYE: conjunctivae/corneas clear. t  LYMPHATIC: Cervical, supraclavicular, and axillary nodes normal.   THROAT & NECK: normal and no erythema or exudates noted. LUNG: clear to auscultation bilaterally  HEART: regular rate and rhythm   ABDOMEN: soft, non-tender. EXTREMITIES:  extremities normal, atraumatic, no cyanosis or edema  SKIN: Normal.  NEUROLOGIC: AOx3. Gait normal.     The above physical exam was reviewed and updated as needed on 04/11/22. Assessment:     1.  Right breast carcinoma: Dx: 8/2021  T1a N0 M0 (Stage I) infiltrating ductal carcinoma, Tumor size < 5 mm, LN -ve, grade 1, ER 95%, ME 30%, Her 2 -ve     ECOG PS 0  Intent of Treatment - curative  Prognosis - excellent    S/P right breast lumpectomy 09/14/2021  S/p adjuvant right breast radiation - completed 12/2/2021    Taking Letrozole 2.5 mg po daily    + hot flashes  Try black cohosh supplements OTC. Plan:       > Continue Letrozole  > DEXA in May 2022  > Follow-up in 6 months      Signed by: Kayleen Cantu NP                     April 11, 2022       I personally saw and evaluated the patient and performed the key components of medical decision making. The history, physical exam, and documentation were performed by Thom Sierra NP. I reviewed and verified the above documentation and modified it as needed. Signed by: Kelvin Kathleen MD                     April 11, 2022        CC. Jannette Stoll MD  CC.  Zondra Pallas, MD

## 2022-04-11 NOTE — LETTER
4/18/2022    Patient: Bhavna Nash   YOB: 1960   Date of Visit: 4/11/2022     Moe Shen MD  [address] [place] [address]  [place] [address]  Via Fax: [phone]    Dear Moe Shen MD,      Thank you for referring Ms. Kt Vigil to [address] [place] [address] for evaluation. My notes for this consultation are attached. If you have questions, please do not hesitate to call me. I look forward to following your patient along with you.       Sincerely,    Rosie Villegas MD

## 2022-09-22 ENCOUNTER — OFFICE VISIT (OUTPATIENT)
Dept: SURGERY | Age: 62
End: 2022-09-22
Payer: COMMERCIAL

## 2022-09-22 VITALS
DIASTOLIC BLOOD PRESSURE: 92 MMHG | HEART RATE: 86 BPM | HEIGHT: 64 IN | SYSTOLIC BLOOD PRESSURE: 141 MMHG | BODY MASS INDEX: 26.12 KG/M2 | WEIGHT: 153 LBS

## 2022-09-22 DIAGNOSIS — Z98.890 S/P LUMPECTOMY, RIGHT BREAST: ICD-10-CM

## 2022-09-22 DIAGNOSIS — Z85.3 HISTORY OF BREAST CANCER IN FEMALE: ICD-10-CM

## 2022-09-22 DIAGNOSIS — Z17.0 MALIGNANT NEOPLASM OF UPPER-OUTER QUADRANT OF RIGHT BREAST IN FEMALE, ESTROGEN RECEPTOR POSITIVE (HCC): Primary | ICD-10-CM

## 2022-09-22 DIAGNOSIS — Z92.3 S/P RADIATION THERAPY: ICD-10-CM

## 2022-09-22 DIAGNOSIS — C50.411 MALIGNANT NEOPLASM OF UPPER-OUTER QUADRANT OF RIGHT BREAST IN FEMALE, ESTROGEN RECEPTOR POSITIVE (HCC): Primary | ICD-10-CM

## 2022-09-22 PROCEDURE — 99213 OFFICE O/P EST LOW 20 MIN: CPT | Performed by: NURSE PRACTITIONER

## 2022-09-22 NOTE — PROGRESS NOTES
HISTORY OF PRESENT ILLNESS  Olimpia Tsang is a 58 y.o. female. HPI Established patient presents for follow-up for RIGHT breast cancer. Denies breast mass, skin changes, nipple discharge and pain. Breast history -   Referring - unknown  21 - screening mammogram: BI-RADS 0  8/10/21 - RIGHT breast diagnostic mammogram done and breast US done at [facility]: BI-RADS 4 - 4 mm mass 12:00 right breast   8/10/21 - RIGHT breast 12 OC Biopsy - IDC, grade 1, ER positive 95%, KS positive 30%, HER2 negative. 21 - breast MRI done at [facility]  21 - RIGHT breast lumpectomy and SLNB - Dr. Uriel Bateman  No residual tumor in breast, 2 lymph nodes negative - T0N0 on surgery  Overall - T1aN0  21 - completed XRT - Dr. Tucker Kohler   2022 - start letrozole - Dr. Samantha Taylor        Family history -  Mother diagnosed with breast cancer at age 72. . Father had prostate cancer.  - genetic testing done - clinically negative with VUS      OB History    No obstetric history on file. Obstetric Comments   Menarche 15, LMP , # of children 2, age of 4st delivery 25, Hysterectomy/oophorectomy /NO/NO, Breast bx NO, history of breast feeding NO, BCP YES, Hormone therapy YES                 Past Surgical History:   Procedure Laterality Date    HX BREAST LUMPECTOMY Right 2021    RIGHT BREAST LUMPECTOMY WITH ULTRASOUND performed by Madonna Aquino MD at [facility]    Physical Exam  Constitutional:       Appearance: Normal appearance. Chest:   Breasts:     Right: No mass, nipple discharge, skin change or tenderness. Left: No mass, nipple discharge, skin change or tenderness. Comments: RIGHT breast significantly smaller than LEFT post treatment; mild XRT changes. Incisions well healed. Musculoskeletal:      Comments: FROM - UE x 2   Lymphadenopathy:      Upper Body:      Right upper body: No supraclavicular or axillary adenopathy.       Left upper body: No supraclavicular or axillary adenopathy. Neurological:      Mental Status: She is alert. Psychiatric:         Attention and Perception: Attention normal.         Mood and Affect: Mood normal.         Speech: Speech normal.         Behavior: Behavior normal.       Visit Vitals  BP (!) 141/92 (BP 1 Location: Right arm, BP Patient Position: Sitting, BP Cuff Size: Small adult)   Pulse 86   Ht 5' 4\" (1.626 m)   Wt 153 lb (69.4 kg)   BMI 26.26 kg/m²       ASSESSMENT and PLAN    ICD-10-CM ICD-9-CM    1. Malignant neoplasm of upper-outer quadrant of right breast in female, estrogen receptor positive (HCC)  C50.411 174.4     Z17.0 V86.0       2. S/P lumpectomy, right breast  Z98.890 V45.89       3. S/P radiation therapy  Z92.3 V66.1       4. History of breast cancer in female  Z85.3 V10.3           Normal exam with no evidence of local recurrence. 10/2022 - reduction surgery for asymmetry post treatment. Seeing Dr. Sherran Cranker. BDmammogram 3D in 5/2022 at [facility] - will request report. Reports some joint pain on the AI. Discussed trial off the medication if it worsens, but she will continue at this time. Will continue to discuss with Dr. Marylen Juba. RTC in 6 months or sooner PRN. She is comfortable with this plan. All questions answered and she stated understanding. Total time spent for this patient - 20 minutes.

## 2022-10-18 NOTE — PROGRESS NOTES
Marni Marie is a 58 y.o. female here for 6 month follow up for right breast cancer. She is taking Letrozole. Pt had COVID end of September. She had terrible headaches and was vomiting. She will be having breast surgery Nov 11th by Dr Tyra Handy. She is still having bad hot flashes and sleeps with a cold pack at night. She is taking Black Cohosh twice daily. She had emergency dental work a few weeks ago. 1. Have you been to the ER, urgent care clinic since your last visit? Hospitalized since your last visit?  no    2. Have you seen or consulted any other health care providers outside of the [address] [place] [address] since your last visit? Include any pap smears or colon screening.  Dr Tyra Handy, Dr Rufino Lin

## 2022-10-19 ENCOUNTER — OFFICE VISIT (OUTPATIENT)
Dept: ONCOLOGY | Age: 62
End: 2022-10-19
Payer: COMMERCIAL

## 2022-10-19 VITALS
HEIGHT: 64 IN | SYSTOLIC BLOOD PRESSURE: 139 MMHG | TEMPERATURE: 98.2 F | HEART RATE: 87 BPM | DIASTOLIC BLOOD PRESSURE: 91 MMHG | BODY MASS INDEX: 25.68 KG/M2 | OXYGEN SATURATION: 98 % | WEIGHT: 150.4 LBS

## 2022-10-19 DIAGNOSIS — C50.411 MALIGNANT NEOPLASM OF UPPER-OUTER QUADRANT OF RIGHT BREAST IN FEMALE, ESTROGEN RECEPTOR POSITIVE (HCC): Primary | ICD-10-CM

## 2022-10-19 DIAGNOSIS — Z17.0 MALIGNANT NEOPLASM OF UPPER-OUTER QUADRANT OF RIGHT BREAST IN FEMALE, ESTROGEN RECEPTOR POSITIVE (HCC): Primary | ICD-10-CM

## 2022-10-19 PROCEDURE — 99215 OFFICE O/P EST HI 40 MIN: CPT | Performed by: INTERNAL MEDICINE

## 2022-10-19 RX ORDER — IPRATROPIUM BROMIDE 42 UG/1
SPRAY, METERED NASAL
COMMUNITY
Start: 2022-10-06

## 2022-10-19 RX ORDER — BUDESONIDE AND FORMOTEROL FUMARATE DIHYDRATE 160; 4.5 UG/1; UG/1
AEROSOL RESPIRATORY (INHALATION)
COMMUNITY
Start: 2022-10-06

## 2022-10-19 RX ORDER — LECITHIN 1200 MG
CAPSULE ORAL
COMMUNITY

## 2022-10-19 RX ORDER — TRAMADOL HYDROCHLORIDE 50 MG/1
50 TABLET ORAL
COMMUNITY

## 2022-10-19 NOTE — PROGRESS NOTES
2001 [facility] [address], 210 [facility], [address] [place] [address]  [phone]    Follow-up Note      Patient: Nadine Christianson MRN: [id number]  SSN: [id number]    YOB: 1960  Age: 58 y.o. Sex: female        Diagnosis:     1. Right breast carcinoma: Dx: 8/2021  T1a N0 M0 (Stage I) infiltrating ductal carcinoma, Tumor size < 5 mm, LN -ve, grade 1, ER 95%, ID 30%, Her 2 -ve     Treatment:     1. Right breast lumpectomy with SLNB - 9/14/2021  2. Completed XRT - DR. James 12/2/2022  3. Started Letrozole - 1/2022    Subjective:      Nadine Christianson is a 58 y.o. female who I am seeing for a new diagnosis of right sided invasive breast carcinoma on request of Dr. Verónica Reyes. She underwent a routine screening mammogram and was noted to have an abnormal mammogram. A biopsy of the right breast revealed IDC Gr 1 ER 95%, ID 30% and Her 2 -ve. She underwent a right sided lumpectomy on 09/14/2021. She completed adjuvant radiation on 12/2/2021. Ms. Gavi Sawyer reports having hot flashes since starting Letrozole in January. She had a follow up with Garrett Torres NP on 3/22. Review of Systems:    Constitutional: Hot flashes  Eyes: negative  Ears, Nose, Mouth, Throat, and Face: negative  Respiratory: negative  Cardiovascular: negative  Gastrointestinal: negative  Genitourinary:negative  Integument/Breast: negative  Hematologic/Lymphatic: negative  Musculoskeletal:negative  Neurological: negative    Review of systems was reviewed and updated as needed on 10/19/22. Past Medical History:   Diagnosis Date    Cancer ([address]     RIGHT BREAST    Chronic pain     RIGHT HIP, BUTTUCK DOWN LEG. \"I WAS TOLD IT WAS NERVE PAIN\"    Hypertension     CONTROLLED WITH MEDS    Nausea & vomiting     ONLY WHEN SHE RECEIVES PERCOCET. HOLD MED, IF PRESCRIBED.     Psychiatric disorder     Thyroid disease      Past Surgical History:   Procedure Laterality Date    HX BREAST LUMPECTOMY Right 9/14/2021    RIGHT BREAST LUMPECTOMY WITH ULTRASOUND performed by Oz Betancur MD at [facility] AMBULATORY OR      Family History   Problem Relation Age of Onset    Breast Cancer Mother 72    Prostate Cancer Father      Social History     Tobacco Use    Smoking status: Never    Smokeless tobacco: Never   Substance Use Topics    Alcohol use: Yes     Alcohol/week: 3.0 standard drinks     Types: 3 Shots of liquor per week      Prior to Admission medications    Medication Sig Start Date End Date Taking? Authorizing Provider   traMADoL (ULTRAM) 50 mg tablet Take 50 mg by mouth every six (6) hours as needed for Pain. Yes Provider, Historical   Symbicort 160-4.5 mcg/actuation HFAA INHALE 2 PUFFS BY MOUTH TWICE DAILY. RINSE MOUTH AFTER USE 10/6/22  Yes Provider, Historical   montelukast sodium (SINGULAIR PO) Take  by mouth. Yes Provider, Historical   ipratropium (ATROVENT) 42 mcg (0.06 %) nasal spray USE 2 SPRAYS IN EACH NOSTRIL FOUR TIMES DAILY 10/6/22  Yes Provider, Historical   black cohosh root extract 160 mg cap Take  by mouth. Yes Provider, Historical   fexofenadine HCl (ALLEGRA PO) Take  by mouth. Yes Provider, Historical   letrozole (FEMARA) 2.5 mg tablet Take 1 Tablet by mouth daily. 1/21/22  Yes Yanique Celis MD   multivitamin (ONE A DAY) tablet Take 1 Tablet by mouth daily. Yes Provider, Historical   cholecalciferol (VITAMIN D3) (5000 Units/125 mcg) tab tablet Take 5,000 Units by mouth daily. Yes Provider, Historical   hydroCHLOROthiazide (HYDRODIURIL) 25 mg tablet Take 25 mg by mouth daily.  8/24/21  Yes Provider, Historical   Euthyrox 100 mcg tablet TAKE 1 TABLET BY MOUTH ONCE DAILY FOR 90 DAYS 7/3/21  Yes Provider, Historical   methylphenidate HCl (RITALIN) 20 mg tablet TAKE 1 TABLET BY MOUTH THREE TIMES DAILY ON AN EMPTY STOMACH FOR 90 DAYS 6/23/21  Yes Provider, Historical   fluticasone propionate (FLONASE) 50 mcg/actuation nasal spray 2 Sprays by Both Nostrils route two (2) times a day. Yes Provider, Historical   fexofenadine-pseudoephedrine (ALLEGRA-D)  mg Tb12 Take 1 Tablet by mouth daily (with breakfast). Yes Provider, Historical   OTHER Allergy Injections [id number]   Yes Provider, Historical   pregabalin (LYRICA) 75 mg capsule Take 75 mg by mouth two (2) times a day. Patient not taking: Reported on 10/19/2022 2/21/22   Provider, Historical   diclofenac EC (VOLTAREN) 75 mg EC tablet Take 75 mg by mouth two (2) times a day. Patient not taking: Reported on 10/19/2022 1/6/22   Provider, Historical   ALPRAZolam [place]) 0.5 mg tablet Take 1 Tablet by mouth three (3) times daily as needed for Anxiety. Max Daily Amount: 1.5 mg. Patient not taking: No sig reported 8/30/21   Mingo Gallagher MD              Allergies   Allergen Reactions    Aller Ext-American Cockroach Itching, Cough and Sneezing    Allerg Ext-Tall Ragweed Pollen Itching    Dog Dander Itching, Cough and Sneezing    Grass Pollen Itching and Sneezing    Mold Shortness of Breath, Itching and Cough    Percocet [Oxycodone-Acetaminophen] Nausea and Vomiting    Tree And Shrub Pollen Shortness of Breath, Itching and Sneezing    Weed Pollen-Short Ragweed Shortness of Breath, Itching, Cough and Sneezing    Levaquin [Levofloxacin] Other (comments)     PT STATES SHE WOULD NOT LIKE THIS MEDICATION AS, \"2  PEOPLE I KNOW HAVE HAD VERY BAD REACTIONS. DONT WANT TO TAKE A CHANCE\"           Objective:     Vitals:    10/19/22 0917   BP: (!) 139/91   Pulse: 87   Temp: 98.2 °F (36.8 °C)   TempSrc: Temporal   SpO2: 98%   Weight: 150 lb 6.4 oz (68.2 kg)   Height: 5' 4\" (1.626 m)      Pain Scale: 0 - No pain/10      Physical Exam:  GENERAL: alert, cooperative, no distress, appears stated age  EYE: conjunctivae/corneas clear. t  LYMPHATIC: Cervical, supraclavicular, and axillary nodes normal.   THROAT & NECK: normal and no erythema or exudates noted.    LUNG: clear to auscultation bilaterally  HEART: regular rate and rhythm   ABDOMEN: soft, non-tender. EXTREMITIES:  extremities normal, atraumatic, no cyanosis or edema  SKIN: Normal.  NEUROLOGIC: AOx3. Gait normal.     The above physical exam was reviewed and updated as needed on 10/19/22. Assessment:     1. Right breast carcinoma: Dx: 8/2021  T1a N0 M0 (Stage I) infiltrating ductal carcinoma, Tumor size < 5 mm, LN -ve, grade 1, ER 95%, CT 30%, Her 2 -ve     ECOG PS 0  Intent of Treatment - curative  Prognosis - excellent    S/P right breast lumpectomy 09/14/2021  S/p adjuvant right breast radiation - completed 12/2/2021    Taking Letrozole 2.5 mg po daily    + hot flashes  Try black cohosh supplements OTC. Can increase to three times a day. Having breast reconstruction to make breast symmetrical      Plan:       > Continue Letrozole  > DEXA in May 2022  > Follow-up in 12 months      Signed by: Gwen Lomas MD                     October 19, 2022        CC. Pj Mistry MD  CC.  Sloan Ku MD

## 2022-11-08 NOTE — PERIOP NOTES
[facility]  Preoperative Instructions    Surgery Date 11/11/2022          Time of Arrival to be called  Contact # [phone]    1. On the day of your surgery, please report to the Surgical Services Registration Desk and sign in at your designated time. The Surgery Center is located to the right of the Emergency Room. 2. You must have someone with you to drive you home. You should not drive a car for 24 hours following surgery. Please make arrangements for a friend or family member to stay with you for the first 24 hours after your surgery. 3. Do not have anything to eat or drink (including water, gum, mints, coffee, juice) after midnight ?11/10/2022  . ? This may not apply to medications prescribed by your physician. ?(Please note below the special instructions with medications to take the morning of your procedure.)    4. We recommend you do not drink any alcoholic beverages for 24 hours before and after your surgery. 5. Contact your surgeons office for instructions on the following medications: non-steroidal anti-inflammatory drugs (i.e. Advil, Aleve), vitamins, and supplements. (Some surgeons will want you to stop these medications prior to surgery and others may allow you to take them)  **If you are currently taking Plavix, Coumadin, Aspirin and/or other blood-thinning agents, contact your surgeon for instructions. ** Your surgeon will partner with the physician prescribing these medications to determine if it is safe to stop or if you need to continue taking. Please do not stop taking these medications without instructions from your surgeon    6. Wear comfortable clothes. Wear glasses instead of contacts. Do not bring any money or jewelry. Please bring picture ID, insurance card, and any prearranged co-payment or hospital payment. Do not wear make-up, particularly mascara the morning of your surgery.   Do not wear nail polish, particularly if you are having foot /hand surgery. Wear your hair loose or down, no ponytails, buns, tamar pins or clips. All body piercings must be removed. Please shower with antibacterial soap for three consecutive days before and on the morning of surgery, but do not apply any lotions, powders or deodorants after the shower on the day of surgery. Please use a fresh towels after each shower. Please sleep in clean clothes and change bed linens the night before surgery. Please do not shave for 48 hours prior to surgery. Shaving of the face is acceptable. 7. You should understand that if you do not follow these instructions your surgery may be cancelled. If your physical condition changes (I.e. fever, cold or flu) please contact your surgeon as soon as possible. 8. It is important that you be on time. If a situation occurs where you may be late, please call [phone] (OR Holding Area). 9. If you have any questions and or problems, please call [phone] (Pre-admission Testing). 10. Your surgery time may be subject to change. You will receive a phone call the evening prior if your time changes. 11.  If having outpatient surgery, you must have someone to drive you here, stay with you during the duration of your stay, and to drive you home at time of discharge. TAKE ALL MEDICATIONS DAY OF SURGERY EXCEPT: vitamins/supplements,       I understand a pre-operative phone call will be made to verify my surgery time. In the event that I am not available, I give permission for a message to be left on my answering service and/or with another person?   yes         ___________________      __________   11/8/2022 @ 2865    (Signature of Patient)             (Witness)                (Date and Time)

## 2022-11-11 ENCOUNTER — ANESTHESIA (OUTPATIENT)
Dept: SURGERY | Age: 62
End: 2022-11-11
Payer: COMMERCIAL

## 2022-11-11 ENCOUNTER — HOSPITAL ENCOUNTER (OUTPATIENT)
Age: 62
Setting detail: OUTPATIENT SURGERY
Discharge: HOME | End: 2022-11-11
Attending: PLASTIC SURGERY | Admitting: PLASTIC SURGERY
Payer: COMMERCIAL

## 2022-11-11 ENCOUNTER — ANESTHESIA EVENT (OUTPATIENT)
Dept: SURGERY | Age: 62
End: 2022-11-11
Payer: COMMERCIAL

## 2022-11-11 VITALS
DIASTOLIC BLOOD PRESSURE: 88 MMHG | OXYGEN SATURATION: 98 % | RESPIRATION RATE: 16 BRPM | TEMPERATURE: 98 F | WEIGHT: 148.15 LBS | BODY MASS INDEX: 25.29 KG/M2 | SYSTOLIC BLOOD PRESSURE: 135 MMHG | HEIGHT: 64 IN | HEART RATE: 72 BPM

## 2022-11-11 DIAGNOSIS — N62 HYPERTROPHY OF BREAST: Primary | ICD-10-CM

## 2022-11-11 PROCEDURE — 77030010509 HC AIRWY LMA MSK TELE -A: Performed by: NURSE ANESTHETIST, CERTIFIED REGISTERED

## 2022-11-11 PROCEDURE — 74011250636 HC RX REV CODE- 250/636: Performed by: PLASTIC SURGERY

## 2022-11-11 PROCEDURE — 77030038692 HC WND DEB SYS IRMX -B: Performed by: PLASTIC SURGERY

## 2022-11-11 PROCEDURE — 77030002966 HC SUT PDS J&J -A: Performed by: PLASTIC SURGERY

## 2022-11-11 PROCEDURE — 76060000035 HC ANESTHESIA 2 TO 2.5 HR: Performed by: PLASTIC SURGERY

## 2022-11-11 PROCEDURE — 74011250636 HC RX REV CODE- 250/636: Performed by: NURSE ANESTHETIST, CERTIFIED REGISTERED

## 2022-11-11 PROCEDURE — 77030037400 HC ADH TISS HI VISC EXOFIN CHMP -B: Performed by: PLASTIC SURGERY

## 2022-11-11 PROCEDURE — 74011000250 HC RX REV CODE- 250: Performed by: NURSE ANESTHETIST, CERTIFIED REGISTERED

## 2022-11-11 PROCEDURE — 77030013079 HC BLNKT BAIR HGGR 3M -A: Performed by: NURSE ANESTHETIST, CERTIFIED REGISTERED

## 2022-11-11 PROCEDURE — 77030014369: Performed by: PLASTIC SURGERY

## 2022-11-11 PROCEDURE — 74011000250 HC RX REV CODE- 250: Performed by: PLASTIC SURGERY

## 2022-11-11 PROCEDURE — 74011250637 HC RX REV CODE- 250/637: Performed by: PLASTIC SURGERY

## 2022-11-11 PROCEDURE — 2709999900 HC NON-CHARGEABLE SUPPLY: Performed by: PLASTIC SURGERY

## 2022-11-11 PROCEDURE — 77030002933 HC SUT MCRYL J&J -A: Performed by: PLASTIC SURGERY

## 2022-11-11 PROCEDURE — 88305 TISSUE EXAM BY PATHOLOGIST: CPT

## 2022-11-11 PROCEDURE — 77030008459 HC STPLR SKN COOP -B: Performed by: PLASTIC SURGERY

## 2022-11-11 PROCEDURE — 74011250636 HC RX REV CODE- 250/636: Performed by: STUDENT IN AN ORGANIZED HEALTH CARE EDUCATION/TRAINING PROGRAM

## 2022-11-11 PROCEDURE — 76210000021 HC REC RM PH II 0.5 TO 1 HR: Performed by: PLASTIC SURGERY

## 2022-11-11 PROCEDURE — 77030011264 HC ELECTRD BLD EXT COVD -A: Performed by: PLASTIC SURGERY

## 2022-11-11 PROCEDURE — 76010000131 HC OR TIME 2 TO 2.5 HR: Performed by: PLASTIC SURGERY

## 2022-11-11 PROCEDURE — 77030013674 HC FIL EXPND TISS ALGN -A: Performed by: PLASTIC SURGERY

## 2022-11-11 PROCEDURE — 76210000016 HC OR PH I REC 1 TO 1.5 HR: Performed by: PLASTIC SURGERY

## 2022-11-11 PROCEDURE — 77030002916 HC SUT ETHLN J&J -A: Performed by: PLASTIC SURGERY

## 2022-11-11 RX ORDER — ONDANSETRON 8 MG/1
8 TABLET, ORALLY DISINTEGRATING ORAL
Qty: 10 TABLET | Refills: 2 | Status: SHIPPED | OUTPATIENT
Start: 2022-11-11

## 2022-11-11 RX ORDER — LIDOCAINE HYDROCHLORIDE 20 MG/ML
INJECTION, SOLUTION EPIDURAL; INFILTRATION; INTRACAUDAL; PERINEURAL AS NEEDED
Status: DISCONTINUED | OUTPATIENT
Start: 2022-11-11 | End: 2022-11-11 | Stop reason: HOSPADM

## 2022-11-11 RX ORDER — PROPOFOL 10 MG/ML
INJECTION, EMULSION INTRAVENOUS AS NEEDED
Status: DISCONTINUED | OUTPATIENT
Start: 2022-11-11 | End: 2022-11-11 | Stop reason: HOSPADM

## 2022-11-11 RX ORDER — SODIUM CHLORIDE 0.9 % (FLUSH) 0.9 %
5-40 SYRINGE (ML) INJECTION AS NEEDED
Status: DISCONTINUED | OUTPATIENT
Start: 2022-11-11 | End: 2022-11-11 | Stop reason: HOSPADM

## 2022-11-11 RX ORDER — HYDROMORPHONE HYDROCHLORIDE 1 MG/ML
0.2 INJECTION, SOLUTION INTRAMUSCULAR; INTRAVENOUS; SUBCUTANEOUS
Status: DISCONTINUED | OUTPATIENT
Start: 2022-11-11 | End: 2022-11-11 | Stop reason: HOSPADM

## 2022-11-11 RX ORDER — HYDROCODONE BITARTRATE AND ACETAMINOPHEN 5; 325 MG/1; MG/1
1 TABLET ORAL ONCE
Status: COMPLETED | OUTPATIENT
Start: 2022-11-11 | End: 2022-11-11

## 2022-11-11 RX ORDER — HYDROCODONE BITARTRATE AND ACETAMINOPHEN 5; 325 MG/1; MG/1
1 TABLET ORAL
Qty: 20 TABLET | Refills: 0 | Status: SHIPPED | OUTPATIENT
Start: 2022-11-11 | End: 2022-11-16

## 2022-11-11 RX ORDER — SODIUM CHLORIDE 0.9 % (FLUSH) 0.9 %
5-40 SYRINGE (ML) INJECTION EVERY 8 HOURS
Status: DISCONTINUED | OUTPATIENT
Start: 2022-11-11 | End: 2022-11-11 | Stop reason: HOSPADM

## 2022-11-11 RX ORDER — KETAMINE HYDROCHLORIDE 50 MG/ML
INJECTION, SOLUTION INTRAMUSCULAR; INTRAVENOUS AS NEEDED
Status: DISCONTINUED | OUTPATIENT
Start: 2022-11-11 | End: 2022-11-11 | Stop reason: HOSPADM

## 2022-11-11 RX ORDER — ONDANSETRON 2 MG/ML
4 INJECTION INTRAMUSCULAR; INTRAVENOUS AS NEEDED
Status: DISCONTINUED | OUTPATIENT
Start: 2022-11-11 | End: 2022-11-11 | Stop reason: HOSPADM

## 2022-11-11 RX ORDER — DEXAMETHASONE SODIUM PHOSPHATE 4 MG/ML
INJECTION, SOLUTION INTRA-ARTICULAR; INTRALESIONAL; INTRAMUSCULAR; INTRAVENOUS; SOFT TISSUE AS NEEDED
Status: DISCONTINUED | OUTPATIENT
Start: 2022-11-11 | End: 2022-11-11 | Stop reason: HOSPADM

## 2022-11-11 RX ORDER — PROPOFOL 10 MG/ML
INJECTION, EMULSION INTRAVENOUS
Status: DISCONTINUED | OUTPATIENT
Start: 2022-11-11 | End: 2022-11-11 | Stop reason: HOSPADM

## 2022-11-11 RX ORDER — TRANEXAMIC ACID 100 MG/ML
INJECTION, SOLUTION INTRAVENOUS AS NEEDED
Status: DISCONTINUED | OUTPATIENT
Start: 2022-11-11 | End: 2022-11-11 | Stop reason: HOSPADM

## 2022-11-11 RX ORDER — FENTANYL CITRATE 50 UG/ML
25 INJECTION, SOLUTION INTRAMUSCULAR; INTRAVENOUS
Status: DISCONTINUED | OUTPATIENT
Start: 2022-11-11 | End: 2022-11-11 | Stop reason: HOSPADM

## 2022-11-11 RX ORDER — FENTANYL CITRATE 50 UG/ML
INJECTION, SOLUTION INTRAMUSCULAR; INTRAVENOUS AS NEEDED
Status: DISCONTINUED | OUTPATIENT
Start: 2022-11-11 | End: 2022-11-11 | Stop reason: HOSPADM

## 2022-11-11 RX ORDER — PHENYLEPHRINE HCL IN 0.9% NACL 0.4MG/10ML
SYRINGE (ML) INTRAVENOUS AS NEEDED
Status: DISCONTINUED | OUTPATIENT
Start: 2022-11-11 | End: 2022-11-11 | Stop reason: HOSPADM

## 2022-11-11 RX ORDER — DEXMEDETOMIDINE HYDROCHLORIDE 100 UG/ML
INJECTION, SOLUTION INTRAVENOUS AS NEEDED
Status: DISCONTINUED | OUTPATIENT
Start: 2022-11-11 | End: 2022-11-11 | Stop reason: HOSPADM

## 2022-11-11 RX ORDER — MIDAZOLAM HYDROCHLORIDE 1 MG/ML
INJECTION, SOLUTION INTRAMUSCULAR; INTRAVENOUS AS NEEDED
Status: DISCONTINUED | OUTPATIENT
Start: 2022-11-11 | End: 2022-11-11 | Stop reason: HOSPADM

## 2022-11-11 RX ORDER — SODIUM CHLORIDE 0.9 % (FLUSH) 0.9 %
5-40 SYRINGE (ML) INJECTION AS NEEDED
OUTPATIENT
Start: 2022-11-11

## 2022-11-11 RX ORDER — SODIUM CHLORIDE, SODIUM LACTATE, POTASSIUM CHLORIDE, CALCIUM CHLORIDE 600; 310; 30; 20 MG/100ML; MG/100ML; MG/100ML; MG/100ML
25 INJECTION, SOLUTION INTRAVENOUS CONTINUOUS
Status: DISCONTINUED | OUTPATIENT
Start: 2022-11-11 | End: 2022-11-11 | Stop reason: HOSPADM

## 2022-11-11 RX ORDER — ONDANSETRON 2 MG/ML
INJECTION INTRAMUSCULAR; INTRAVENOUS AS NEEDED
Status: DISCONTINUED | OUTPATIENT
Start: 2022-11-11 | End: 2022-11-11 | Stop reason: HOSPADM

## 2022-11-11 RX ORDER — SODIUM CHLORIDE 0.9 % (FLUSH) 0.9 %
5-40 SYRINGE (ML) INJECTION EVERY 8 HOURS
OUTPATIENT
Start: 2022-11-11

## 2022-11-11 RX ORDER — HYDROMORPHONE HYDROCHLORIDE 2 MG/ML
INJECTION, SOLUTION INTRAMUSCULAR; INTRAVENOUS; SUBCUTANEOUS AS NEEDED
Status: DISCONTINUED | OUTPATIENT
Start: 2022-11-11 | End: 2022-11-11 | Stop reason: HOSPADM

## 2022-11-11 RX ADMIN — PROPOFOL 175 MCG/KG/MIN: 10 INJECTION, EMULSION INTRAVENOUS at 08:36

## 2022-11-11 RX ADMIN — LIDOCAINE HYDROCHLORIDE 20 MG: 20 INJECTION, SOLUTION EPIDURAL; INFILTRATION; INTRACAUDAL; PERINEURAL at 08:36

## 2022-11-11 RX ADMIN — PROPOFOL 150 MG: 10 INJECTION, EMULSION INTRAVENOUS at 08:36

## 2022-11-11 RX ADMIN — HYDROMORPHONE HYDROCHLORIDE 0.5 MG: 2 INJECTION, SOLUTION INTRAMUSCULAR; INTRAVENOUS; SUBCUTANEOUS at 09:12

## 2022-11-11 RX ADMIN — DEXMEDETOMIDINE HYDROCHLORIDE 10 MCG: 100 INJECTION, SOLUTION, CONCENTRATE INTRAVENOUS at 08:32

## 2022-11-11 RX ADMIN — FENTANYL CITRATE 50 MCG: 50 INJECTION, SOLUTION INTRAMUSCULAR; INTRAVENOUS at 08:52

## 2022-11-11 RX ADMIN — FENTANYL CITRATE 25 MCG: 50 INJECTION, SOLUTION INTRAMUSCULAR; INTRAVENOUS at 08:39

## 2022-11-11 RX ADMIN — TRANEXAMIC ACID 1 G: 100 INJECTION, SOLUTION INTRAVENOUS at 09:12

## 2022-11-11 RX ADMIN — DEXAMETHASONE SODIUM PHOSPHATE 8 MG: 4 INJECTION, SOLUTION INTRAMUSCULAR; INTRAVENOUS at 08:40

## 2022-11-11 RX ADMIN — KETAMINE HYDROCHLORIDE 10 MG: 50 INJECTION, SOLUTION, CONCENTRATE INTRAMUSCULAR; INTRAVENOUS at 08:40

## 2022-11-11 RX ADMIN — KETAMINE HYDROCHLORIDE 10 MG: 50 INJECTION, SOLUTION, CONCENTRATE INTRAMUSCULAR; INTRAVENOUS at 08:44

## 2022-11-11 RX ADMIN — MIDAZOLAM HYDROCHLORIDE 2 MG: 1 INJECTION, SOLUTION INTRAMUSCULAR; INTRAVENOUS at 08:32

## 2022-11-11 RX ADMIN — WATER 2 G: 1 INJECTION INTRAMUSCULAR; INTRAVENOUS; SUBCUTANEOUS at 08:32

## 2022-11-11 RX ADMIN — Medication 3 AMPULE: at 07:37

## 2022-11-11 RX ADMIN — HYDROCODONE BITARTRATE AND ACETAMINOPHEN 1 TABLET: 5; 325 TABLET ORAL at 11:52

## 2022-11-11 RX ADMIN — Medication 40 MCG: at 08:47

## 2022-11-11 RX ADMIN — SODIUM CHLORIDE, POTASSIUM CHLORIDE, SODIUM LACTATE AND CALCIUM CHLORIDE 25 ML/HR: 600; 310; 30; 20 INJECTION, SOLUTION INTRAVENOUS at 07:37

## 2022-11-11 RX ADMIN — HYDROMORPHONE HYDROCHLORIDE 0.5 MG: 2 INJECTION, SOLUTION INTRAMUSCULAR; INTRAVENOUS; SUBCUTANEOUS at 10:00

## 2022-11-11 RX ADMIN — ONDANSETRON HYDROCHLORIDE 4 MG: 2 INJECTION, SOLUTION INTRAMUSCULAR; INTRAVENOUS at 09:52

## 2022-11-11 RX ADMIN — KETAMINE HYDROCHLORIDE 10 MG: 50 INJECTION, SOLUTION, CONCENTRATE INTRAMUSCULAR; INTRAVENOUS at 08:50

## 2022-11-11 RX ADMIN — HYDROMORPHONE HYDROCHLORIDE 0.5 MG: 2 INJECTION, SOLUTION INTRAMUSCULAR; INTRAVENOUS; SUBCUTANEOUS at 09:32

## 2022-11-11 RX ADMIN — FENTANYL CITRATE 25 MCG: 50 INJECTION, SOLUTION INTRAMUSCULAR; INTRAVENOUS at 08:43

## 2022-11-11 NOTE — DISCHARGE INSTRUCTIONS
Empty and record drain output twice daily or as needed. May remove bra and dressings in 24-48hrs and shower and get incisions wet. Wear sports bra unless showering. No heavy lifting or vigorous activity. DISCHARGE SUMMARY from Nurse    PATIENT INSTRUCTIONS:    After general anesthesia or intravenous sedation, for 24 hours or while taking prescription narcotics:    Have someone responsible help you with your care  Limit your activities  Do not drive and operate hazardous machinery  Do not make important personal, legal or business decisions  Do not drink alcoholic beverages  If you have not urinated within 8 hours after discharge, please contact your surgeon on call  Resume your medications unless otherwise instructed    From general anesthesia, intravenous sedation, or while taking prescription narcotics, you may experience:    Drowsiness, dizziness, sleepiness, or confusion  Difficulty remembering or delayed reaction times  Difficulty with your balance, especially while walking, move slowly and carefully, do not make sudden position changes  Difficulty focusing or blurred vision    You may not be aware of slight changes in your behavior and/or your reaction time because of the medication used during and after your procedure. Report the following to your surgeon:  Excessive pain, swelling, redness or odor of or around the surgical area  Temperature over 100.5  Nausea and vomiting lasting longer than 4 hours or if unable to take medications  Any signs of decreased circulation or nerve impairment to extremity: change in color, persistent numbness, tingling, coldness or increase pain  Any questions or concerns         IF YOU REPORT TO AN EMERGENCY ROOM, DOCTOR'S OFFICE OR HOSPITAL WITHIN 24 HOURS AFTER YOUR PROCEDURE, BRING THIS SHEET AND YOUR AFTER VISIT SUMMARY WITH YOU AND GIVE IT TO THE PHYSICIAN OR NURSE ATTENDING YOU. These are general instructions for a healthy lifestyle (if applicable):     No smoking/ No tobacco products/ Avoid exposure to secondhand smoke  Surgeon General's Warning:  Quitting smoking now greatly reduces serious risk to your health. Obesity, smoking, and sedentary lifestyle greatly increases your risk for illness    A healthy diet, regular physical exercise & weight monitoring are important for maintaining a healthy lifestyle    You may be retaining fluid if you have a history of heart failure or if you experience any of the following symptoms:  Weight gain of 3 pounds or more overnight or 5 pounds in a week, increased swelling in our hands or feet or shortness of breath while lying flat in bed. Please call your doctor as soon as you notice any of these symptoms; do not wait until your next office visit. A common side effect of anesthesia following surgery is nausea and/or vomiting. In order to decrease symptoms, it is wise to avoid foods that are high in fat, greasy foods, milk products, and spicy foods for the first 24 hours. Acceptable foods for the first 24 hours following surgery include but are not limited to:    soup  broth  toast   crackers   applesauce  bananas   mashed potatoes,  soft or scrambled eggs  oatmeal  jello    It is important to eat when taking your pain medication. This will help to prevent nausea. If possible, please try to time your meals with your medications. It is very important to stay hydrated following surgery. Sip fluids frequently while awake. Avoid acidic drinks such as citrus juices and soda for 24 hours. Carbonated beverages may cause bloating and gas. Acceptable fluids include:    water (flavor packets may add variety)  coffee or tea (in moderation)  Gatorade  Ayan-Aid  apple juice  cranberry juice    You are encouraged to cough and deep breathe every hour when awake. This will help to prevent respiratory complications following anesthesia.  You may want to hug a pillow when coughing and sneezing to add additional support to the surgical area and to decrease discomfort if you had abdominal or chest surgery. If you are discharged home with support stockings, you may remove them after 24 hours. Support stockings are used to help prevent blood clots in the legs following surgery. TO PREVENT AN INFECTION      [address] YOUR HANDS    To prevent infection, good handwashing is the most important thing you or your caregiver can do. Wash your hands with soap and water or use the hand  we gave you before you touch any wounds. SHOWER    Use the antibacterial soap we gave you when you take a shower. Shower with this soap until your wounds are healed. To reach all areas of your body, you may need someone to help you. Dont forget to clean your belly button with every shower. USE CLEAN SHEETS    Use freshly cleaned sheets on your bed after surgery. To keep the surgery site clean, do not allow pets to sleep with you while your wound is still healing. STOP SMOKING    Stop smoking, or at least cut back on smoking    Smoking slows your healing. CONTROL YOUR BLOOD SUGAR    High blood sugars slow wound healing. If you are diabetic, control your blood sugar levels before and after your surgery. Please take time to review all of your Home Care Instructions and Medication Information sheets provided in your discharge packet. If you have any questions, please contact your surgeon's office. Thank you. The discharge information has been reviewed with the patient and instruction recipient. The patient and instruction recipient verbalized understanding. Discharge medications reviewed with the patient and instruction recipient and appropriate educational materials and side effects teaching were provided. Please provide this summary of care documentation to your next provider. Hydrocodone/Acetaminophen (Vicodin, Norco, Lortab) - (By mouth)   Why this medicine is used:   Treats pain.   Contact a nurse or doctor right away if you have:  Blistering, peeling, red skin rash  Fast or slow heartbeat, shallow breathing, blue lips, fingernails, or skin  Anxiety, restlessness, muscle spasms, twitching, seeing or hearing things that are not there  Dark urine or pale stools, yellow skin or eyes  Extreme weakness, sweating, seizures, cold or clammy skin  Lightheadedness, dizziness, fainting, fever, sweating     Common side effects:  Constipation, nausea, vomiting, loss of appetite, stomach pain  Tiredness or sleepiness  © 2017 [facility] Information is for End User's use only and may not be sold, redistributed or otherwise used for commercial purposes.

## 2022-11-11 NOTE — ANESTHESIA POSTPROCEDURE EVALUATION
Procedure(s):  RIGHT BREAST RECONSTRUCTION LEFT BREAST REDUCTION. general    Anesthesia Post Evaluation      Multimodal analgesia: multimodal analgesia used between 6 hours prior to anesthesia start to PACU discharge  Patient location during evaluation: bedside  Patient participation: complete - patient participated  Level of consciousness: awake  Pain management: adequate  Airway patency: patent  Anesthetic complications: no  Cardiovascular status: acceptable  Respiratory status: acceptable  Hydration status: acceptable  Post anesthesia nausea and vomiting:  controlled  Final Post Anesthesia Temperature Assessment:  Normothermia (36.0-37.5 degrees C)      INITIAL Post-op Vital signs:   Vitals Value Taken Time   /89 11/11/22 1145   Temp 36.4 °C (97.6 °F) 11/11/22 1040   Pulse 76 11/11/22 1150   Resp 13 11/11/22 1150   SpO2 96 % 11/11/22 1150   Vitals shown include unvalidated device data.

## 2022-11-11 NOTE — PERIOP NOTES
Handoff Report from Operating Room to PACU  1038  [facility] [address] [employer] regarding Joao Dash. Surgeon(s):  Miguelina Santoyo MD  And Procedure(s) (LRB):  RIGHT BREAST RECONSTRUCTION LEFT BREAST REDUCTION (Bilateral)  confirmed   with allergies and dressings discussed. Anesthesia type, drugs, patient history, complications, estimated blood loss, vital signs, intake and output, and last pain medication, lines, and temperature were reviewed.     12:00 PM  Report off to [address] [place] [address] pt trasnferred to phase II for discharge

## 2022-11-11 NOTE — BRIEF OP NOTE
Brief Postoperative Note    Patient: Dipti Cabrera  YOB: 1960  MRN: [id number]    Date of Procedure: 11/11/2022     Pre-Op Diagnosis: BREAST CANCER, breast hypertrophy    Post-Op Diagnosis: Same as preoperative diagnosis.       Procedure(s):  RIGHT BREAST RECONSTRUCTION using breast reduction techniques (310g total resected) LEFT BREAST REDUCTION (800g total)      Surgeon(s):  Kareen Liang MD    Surgical Assistant: Surg Asst-1: Orest Naval    Anesthesia: General     Estimated Blood Loss (mL): less than 50     Complications: None    Specimens:   ID Type Source Tests Collected by Time Destination   1 : right breast tissue, stitch superior  Preservative Breast  Kareen Liang MD 11/11/2022 4135 Pathology   2 : left breast tissue Preservative Breast  Kareen Liang MD 11/11/2022 2095 Pathology        Implants: * No implants in log *    Drains:   Nik-Nails Drain 11/11/22 Right Chest (Active)       Nik-Nails Drain 11/11/22 Left Chest (Active)       Findings: severe radiation change right breast    Electronically Signed by Marycarmen Gold MD on 11/11/2022 at 10:23 AM

## 2022-11-11 NOTE — ANESTHESIA PREPROCEDURE EVALUATION
Relevant Problems   No relevant active problems       Anesthetic History     PONV          Review of Systems / Medical History  Patient summary reviewed, nursing notes reviewed and pertinent labs reviewed    Pulmonary  Within defined limits                 Neuro/Psych         Psychiatric history     Cardiovascular    Hypertension: well controlled              Exercise tolerance: >4 METS     GI/Hepatic/Renal                Endo/Other      Hypothyroidism  Arthritis     Other Findings            Physical Exam    Airway  Mallampati: III  TM Distance: > 6 cm  Neck ROM: normal range of motion   Mouth opening: Normal     Cardiovascular    Rhythm: regular           Dental  No notable dental hx       Pulmonary  Breath sounds clear to auscultation               Abdominal         Other Findings            Anesthetic Plan    ASA: 2  Anesthesia type: general          Induction: Intravenous  Anesthetic plan and risks discussed with: Patient      Hx of PONV, multimodal anesthesia, 2 antiemetics.

## 2022-11-11 NOTE — PROGRESS NOTES
Patient discharged to home. Iv removed. Discharge instructions, scripts, and medication education done with patient and spouse in phase 2. Drain education/demo done with patient and spouse in phase 2.

## 2022-11-15 NOTE — OP NOTES
[address]  OPERATIVE REPORT    Name:  Sommer Rodriguez  MR#:  [id number]  :  1960  ACCOUNT #:  [de-identified]  DATE OF SERVICE:  2022      PREOPERATIVE DIAGNOSES:  1. Personal history of right breast cancer. 2.  Partial absence of right breast.  3.  Right breast deformity. 4.  Breast disproportion. 5.  Bilateral breast symptomatic hypertrophy. POSTOPERATIVE DIAGNOSES:  1. Personal history of right breast cancer. 2.  Partial absence of right breast.  3.  Right breast deformity. 4.  Breast disproportion. 5.  Bilateral breast symptomatic hypertrophy. PROCEDURE PERFORMED:  1. Right breast reconstruction using breast reduction techniques, including resection of 310 g.  2.  Left breast reduction for symmetry (800 g resection). SURGEON:  Maureen Mojica MD    ASSISTANT:  Gael Meza. ANESTHESIA:  General endotracheal.    COMPLICATIONS:  None. SPECIMENS REMOVED:  Right and left breast tissue. IMPLANTS:  None. ESTIMATED BLOOD LOSS:  Less than 50 mL. INDICATIONS:  This 60-year-old female presented with history of right breast cancer that was treated with a fairly large volume lumpectomy, followed by adjuvant radiation therapy. This had left her with significant right breast deformity, as well as a much smaller right breast compared to her left. She had long complained of a large, pendulous breasts that caused neck, back, and shoulder pain as well. The symptoms were refractory to conservative treatment. Right breast reconstruction was indicated to correct her deformity from breast cancer. Left breast reduction was also indicated for symmetry. Risks, benefits, and alternatives were discussed preoperatively, and she agreed to proceed. PROCEDURE:  After informed consent was obtained, she was marked preoperatively in the holding area. She was taken to the operating room, where general anesthetic was given and an endotracheal tube placed.   The breasts were prepped and draped. Small stab incisions were made in the lower poles. Both breasts were then infiltrated with a total of approximately 800 mL of a wetting solution consisting of 1 ampule epinephrine, 30 mL 1% plain lidocaine, and 30 mL of 0.5% bupivacaine per liter of warmed lactated Ringer's. After awaiting 10 minutes, attention was turned to the right breast, where a vertical type mammoplasty pattern had been outlined, along with a superomedially based pedicle. The pedicle was de-epithelialized, excluding the nipple-areolar complex. The pedicle was then sharply elevated off of the breast parenchyma at a thickness of 3-4 cm. Remaining breast parenchyma within the vertical pattern was then excised. The bulk of the excision was from the lower and lateral breast.  Breast tissue on this side was notably firm and elastic from radiation changes. When the resection was complete, hemostasis was obtained, the wound was irrigated. The nipple was transposed superiorly and secured with 3-0 Vicryl. The vertical pillars were approximated with 2-0 PDS, thereby coning the breast.  Remaining incisions were closed in layers with absorbable suture over a 7-mm flat Nik-Nails drain. Attention was then turned to left side, where a similar procedure was performed, essentially performing a larger breast reduction via a vertical type mammoplasty with superomedial pedicle. Again, the superomedial nipple pedicle was de-epithelialized. The pedicle was sharply raised. Breast parenchyma was resected and sent for pathology. Hemostasis was obtained. The nipple was transposed and secured. Vertical pillars were approximated, and final closure was performed in layers with absorbable suture over a 7-mm flat KEVEN drain. She was placed in the seated position, where shape and symmetry were found to be satisfactory. Dermabond and dry dressings were applied, along with a surgical bra.   She tolerated the procedure well and was transferred to the recovery room in good condition after extubation.         Juvenal Madden MD      [id number]  D:  11/15/2022 9:24  T:  11/15/2022 10:58  JOB #:  [id number]  CC:  MD Diego Holland MD

## 2022-11-23 DIAGNOSIS — Z17.0 MALIGNANT NEOPLASM OF UPPER-OUTER QUADRANT OF RIGHT BREAST IN FEMALE, ESTROGEN RECEPTOR POSITIVE (HCC): ICD-10-CM

## 2022-11-23 DIAGNOSIS — C50.411 MALIGNANT NEOPLASM OF UPPER-OUTER QUADRANT OF RIGHT BREAST IN FEMALE, ESTROGEN RECEPTOR POSITIVE (HCC): ICD-10-CM

## 2022-11-24 RX ORDER — LETROZOLE 2.5 MG/1
TABLET, FILM COATED ORAL
Qty: 90 TABLET | Refills: 0 | Status: SHIPPED | OUTPATIENT
Start: 2022-11-24

## 2023-03-14 DIAGNOSIS — Z17.0 MALIGNANT NEOPLASM OF UPPER-OUTER QUADRANT OF RIGHT BREAST IN FEMALE, ESTROGEN RECEPTOR POSITIVE (HCC): ICD-10-CM

## 2023-03-14 DIAGNOSIS — C50.411 MALIGNANT NEOPLASM OF UPPER-OUTER QUADRANT OF RIGHT BREAST IN FEMALE, ESTROGEN RECEPTOR POSITIVE (HCC): ICD-10-CM

## 2023-03-14 RX ORDER — LETROZOLE 2.5 MG/1
TABLET, FILM COATED ORAL
Qty: 90 TABLET | Refills: 0 | Status: SHIPPED | OUTPATIENT
Start: 2023-03-14

## 2023-03-23 ENCOUNTER — OFFICE VISIT (OUTPATIENT)
Dept: SURGERY | Age: 63
End: 2023-03-23
Payer: COMMERCIAL

## 2023-03-23 VITALS — BODY MASS INDEX: 25.27 KG/M2 | WEIGHT: 148 LBS | HEIGHT: 64 IN

## 2023-03-23 DIAGNOSIS — Z85.3 HISTORY OF BREAST CANCER IN FEMALE: ICD-10-CM

## 2023-03-23 DIAGNOSIS — Z92.3 S/P RADIATION THERAPY: ICD-10-CM

## 2023-03-23 DIAGNOSIS — Z17.0 MALIGNANT NEOPLASM OF UPPER-OUTER QUADRANT OF RIGHT BREAST IN FEMALE, ESTROGEN RECEPTOR POSITIVE (HCC): Primary | ICD-10-CM

## 2023-03-23 DIAGNOSIS — C50.411 MALIGNANT NEOPLASM OF UPPER-OUTER QUADRANT OF RIGHT BREAST IN FEMALE, ESTROGEN RECEPTOR POSITIVE (HCC): Primary | ICD-10-CM

## 2023-03-23 DIAGNOSIS — Z98.890 S/P LUMPECTOMY, RIGHT BREAST: ICD-10-CM

## 2023-03-23 PROCEDURE — 99213 OFFICE O/P EST LOW 20 MIN: CPT | Performed by: NURSE PRACTITIONER

## 2023-03-23 NOTE — PROGRESS NOTES
HISTORY OF PRESENT ILLNESS  Gin Castaneda is a 58 y.o. female. HPI Established patient presents for follow-up for RIGHT breast cancer. Denies breast mass, skin changes, nipple discharge and pain. Breast history -   Referring - unknown  21 - screening mammogram: BI-RADS 0  8/10/21 - RIGHT breast diagnostic mammogram done and breast US done at [facility]: BI-RADS 4 - 4 mm mass 12:00 right breast   8/10/21 - RIGHT breast 12 OC Biopsy - IDC, grade 1, ER positive 95%, [place] positive 30%, HER2 negative. 21 - breast MRI done at [facility]  21 - RIGHT breast lumpectomy and SLNB - Dr. Marco Frost  No residual tumor in breast, 2 lymph nodes negative - T0N0 on surgery  Overall - T1aN0  21 - completed XRT - Dr. Dali Moser   2022 - start letrozole - Dr. Javad Jaimes  2022 - BILATERAL breast reduction - Dr. Josee Loyola        Family history -  Mother diagnosed with breast cancer at age 72. . Father had prostate cancer.  - genetic testing done - clinically negative with VUS      OB History    No obstetric history on file. Obstetric Comments   Menarche 15, LMP , # of children 2, age of 4st delivery 25, Hysterectomy/oophorectomy /NO/NO, Breast bx NO, history of breast feeding NO, BCP YES, Hormone therapy YES                 Past Surgical History:   Procedure Laterality Date    HX BREAST LUMPECTOMY Right 2021    RIGHT BREAST LUMPECTOMY WITH ULTRASOUND performed by Lani Hernandez MD at [address] [place] [address] Bilateral 2022    RIGHT BREAST RECONSTRUCTION LEFT BREAST REDUCTION performed by Jimbo Chavez MD at \[facility]"" MAIN OR    HX MENISCECTOMY Right     HX ORTHOPAEDIC Left     elbow tendon repair    IR Galina MAR    Physical Exam  Constitutional:       Appearance: Normal appearance. Chest:   Breasts:     Right: No mass, nipple discharge, skin change or tenderness.       Left: No mass, nipple discharge, skin change or tenderness. Comments: BILATERAL breast reduction  RIGHT lumpectomy and SLNB - post XRT skin changes; RIGHT breast firmer than LEFT  Musculoskeletal:      Comments: FROM - UE x 2   Lymphadenopathy:      Upper Body:      Right upper body: No supraclavicular or axillary adenopathy. Left upper body: No supraclavicular or axillary adenopathy. Neurological:      Mental Status: She is alert. Psychiatric:         Attention and Perception: Attention normal.         Mood and Affect: Mood normal.         Speech: Speech normal.         Behavior: Behavior normal.       Visit Vitals  Ht 5' 3.75\" (1.619 m)   Wt 148 lb (67.1 kg)   BMI 25.60 kg/m²       ASSESSMENT and PLAN    ICD-10-CM ICD-9-CM    1. Malignant neoplasm of upper-outer quadrant of right breast in female, estrogen receptor positive (HCC)  C50.411 174.4     Z17.0 V86.0       2. S/P lumpectomy, right breast  Z98.890 V45.89       3. S/P radiation therapy  Z92.3 V66.1       4. History of breast cancer in female  Z80.3 V10.3 JESSE 3D BLANKA W MAMMO BI DX INCL CAD          Normal exam with no evidence of local recurrence. 11/2022 - BILATERAL reduction with Dr. Phyllis Patel. BDmammogram 3D in 5/2023. Continues on AI and has follow-up Dr. Ethel Broderick. RTC in 6 months or sooner PRN. She is comfortable with this plan. All questions answered and she stated understanding. Total time spent for this patient - 20 minutes.

## 2023-04-22 ENCOUNTER — TRANSCRIBE ORDERS (OUTPATIENT)
Facility: HOSPITAL | Age: 63
End: 2023-04-22

## 2023-04-22 DIAGNOSIS — Z85.3 HISTORY OF BREAST CANCER IN FEMALE: Primary | ICD-10-CM

## 2023-04-23 DIAGNOSIS — Z85.3 HISTORY OF BREAST CANCER IN FEMALE: Primary | ICD-10-CM

## 2023-06-06 ENCOUNTER — HOSPITAL ENCOUNTER (OUTPATIENT)
Facility: HOSPITAL | Age: 63
Discharge: HOME | End: 2023-06-09

## 2023-06-06 DIAGNOSIS — Z85.3 HISTORY OF BREAST CANCER IN FEMALE: ICD-10-CM

## 2023-07-10 RX ORDER — LETROZOLE 2.5 MG/1
TABLET, FILM COATED ORAL
Qty: 90 TABLET | Refills: 0 | Status: SHIPPED | OUTPATIENT
Start: 2023-07-10

## 2023-09-18 ENCOUNTER — OFFICE VISIT (OUTPATIENT)
Age: 63
End: 2023-09-18
Payer: COMMERCIAL

## 2023-09-18 VITALS — WEIGHT: 148 LBS | BODY MASS INDEX: 26.22 KG/M2 | HEIGHT: 63 IN

## 2023-09-18 DIAGNOSIS — Z98.890 S/P LUMPECTOMY OF BREAST: ICD-10-CM

## 2023-09-18 DIAGNOSIS — Z92.3 S/P RADIATION THERAPY: ICD-10-CM

## 2023-09-18 DIAGNOSIS — Z85.3 HISTORY OF BREAST CANCER IN FEMALE: ICD-10-CM

## 2023-09-18 DIAGNOSIS — Z17.0 MALIGNANT NEOPLASM OF UPPER-OUTER QUADRANT OF RIGHT BREAST IN FEMALE, ESTROGEN RECEPTOR POSITIVE (HCC): Primary | ICD-10-CM

## 2023-09-18 DIAGNOSIS — C50.411 MALIGNANT NEOPLASM OF UPPER-OUTER QUADRANT OF RIGHT BREAST IN FEMALE, ESTROGEN RECEPTOR POSITIVE (HCC): Primary | ICD-10-CM

## 2023-09-18 PROCEDURE — 99213 OFFICE O/P EST LOW 20 MIN: CPT | Performed by: NURSE PRACTITIONER

## 2023-09-18 NOTE — PROGRESS NOTES
HISTORY OF PRESENT ILLNESS  Corrina Leggett is a 61 y.o. female     HPI Established patient presents for follow-up for RIGHT breast cancer. Denies breast mass, skin changes, nipple discharge and pain. Breast history -   Referring - unknown  21 - screening mammogram: BI-RADS 0  8/10/21 - RIGHT breast diagnostic mammogram done and breast US done at [facility]: BI-RADS 4 - 4 mm mass 12:00 right breast   8/10/21 - RIGHT breast 12 OC Biopsy - IDC, grade 1, ER positive 95%, [place] positive 30%, HER2 negative. 21 - breast MRI done at [address]  [facility]  21 - RIGHT breast lumpectomy and SLNB - Dr. Justino Rodrigez  No residual tumor in breast, 2 lymph nodes negative - T0N0 on surgery  Overall - T1aN0  21 - completed XRT - Dr. Dia Pathak   2022 - start letrozole - Dr. Malathi Kearney  2022 - BILATERAL breast reduction - Dr. Susen Mcardle          Family history -  Mother diagnosed with breast cancer at age 72. . Father had prostate cancer.  - genetic testing done - clinically negative with VUS          OB History    No obstetric history on file.       Obstetric Comments   Menarche 15, LMP , # of children 2, age of 4st delivery 25, Hysterectomy/oophorectomy /NO/NO, Breast bx NO, history of breast feeding NO, BCP YES, Hormone therapy YES                   Past Surgical History:   Procedure Laterality Date    BREAST LUMPECTOMY Right 2021    RIGHT BREAST LUMPECTOMY WITH ULTRASOUND performed by Janeen Mann MD at [address] [facility] Bilateral 2022    RIGHT BREAST RECONSTRUCTION LEFT BREAST REDUCTION performed by Steph Joe MD at [facility] MAIN OR    BREAST REDUCTION SURGERY Left 2022    IR Jacob Boas Dr. Riley Carmine Left     elbow tendon repair           Mammogram Result (most recent):  [facility] OMAYRA DIGITAL DIAGNOSTIC BILATERAL 2023    Narrative  INDICATION: Personal history of malignant

## 2023-10-10 ENCOUNTER — TELEPHONE (OUTPATIENT)
Age: 63
End: 2023-10-10

## 2023-10-10 DIAGNOSIS — C50.411 MALIGNANT NEOPLASM OF UPPER-OUTER QUADRANT OF RIGHT FEMALE BREAST, UNSPECIFIED ESTROGEN RECEPTOR STATUS (HCC): ICD-10-CM

## 2023-10-10 DIAGNOSIS — Z17.0 ESTROGEN RECEPTOR POSITIVE STATUS (ER+): ICD-10-CM

## 2023-10-10 DIAGNOSIS — C50.411 MALIGNANT NEOPLASM OF UPPER-OUTER QUADRANT OF RIGHT FEMALE BREAST, UNSPECIFIED ESTROGEN RECEPTOR STATUS (HCC): Primary | ICD-10-CM

## 2023-10-10 RX ORDER — LETROZOLE 2.5 MG/1
2.5 TABLET, FILM COATED ORAL DAILY
Qty: 90 TABLET | Refills: 4 | Status: SHIPPED | OUTPATIENT
Start: 2023-10-10

## 2023-10-10 RX ORDER — LETROZOLE 2.5 MG/1
2.5 TABLET, FILM COATED ORAL DAILY
Qty: 90 TABLET | Refills: 4 | Status: SHIPPED | OUTPATIENT
Start: 2023-10-10 | End: 2023-10-10 | Stop reason: SDUPTHER

## 2023-10-10 NOTE — TELEPHONE ENCOUNTER
Patient needs a refrill on letrizole she has an already s/d appt for 10/19. She will be out before then. Pharmacy said that she had to call us and she is. Asking to please send a refill. Send to [facility] on [address] [place] [address].

## 2023-10-19 ENCOUNTER — OFFICE VISIT (OUTPATIENT)
Age: 63
End: 2023-10-19
Payer: COMMERCIAL

## 2023-10-19 VITALS
HEIGHT: 63 IN | TEMPERATURE: 98.3 F | WEIGHT: 145 LBS | OXYGEN SATURATION: 97 % | HEART RATE: 79 BPM | DIASTOLIC BLOOD PRESSURE: 75 MMHG | BODY MASS INDEX: 25.69 KG/M2 | SYSTOLIC BLOOD PRESSURE: 111 MMHG

## 2023-10-19 DIAGNOSIS — C50.411 MALIGNANT NEOPLASM OF UPPER-OUTER QUADRANT OF RIGHT FEMALE BREAST, UNSPECIFIED ESTROGEN RECEPTOR STATUS (HCC): Primary | ICD-10-CM

## 2023-10-19 PROCEDURE — 99213 OFFICE O/P EST LOW 20 MIN: CPT | Performed by: INTERNAL MEDICINE

## 2023-10-19 RX ORDER — LOSARTAN POTASSIUM 50 MG/1
50 TABLET ORAL DAILY
COMMUNITY
Start: 2023-09-20

## 2023-10-19 NOTE — PROGRESS NOTES
Christa Page is a 58 y.o. female here for one year follow up for right breast cancer. She is taking Letrozole. She is taking everyday. Pt doing well. No concerns brought up. 1. Have you been to the ER, urgent care clinic since your last visit? Hospitalized since your last visit?  no    2. Have you seen or consulted any other health care providers outside of the [address] [place] [address] since your last visit? Include any pap smears or colon screening.  no
pharmacy for cheaper option      + hot flashes - better   Joint pains is better       Having breast reconstruction to make breast symmetrical             Plan:           > Continue Letrozole   > DEXA in May 2024   > Follow-up in 12 months        Signed by: Dayana Lora MD                     October 19, 2023        CC. Cherelle Kang MD   CC.  Chiquita Neville MD

## 2023-12-11 ENCOUNTER — TELEPHONE (OUTPATIENT)
Age: 63
End: 2023-12-11

## 2023-12-11 DIAGNOSIS — Z17.0 ESTROGEN RECEPTOR POSITIVE STATUS (ER+): ICD-10-CM

## 2023-12-11 DIAGNOSIS — C50.411 MALIGNANT NEOPLASM OF UPPER-OUTER QUADRANT OF RIGHT FEMALE BREAST, UNSPECIFIED ESTROGEN RECEPTOR STATUS (HCC): ICD-10-CM

## 2023-12-11 RX ORDER — LETROZOLE 2.5 MG/1
2.5 TABLET, FILM COATED ORAL DAILY
Qty: 90 TABLET | Refills: 4 | Status: SHIPPED | OUTPATIENT
Start: 2023-12-11

## 2023-12-11 NOTE — TELEPHONE ENCOUNTER
Patient called and was directed by provider to go to [facility] for her Letrozole. Patient went on and created an account. Needs a form anmd script sent to the, Patient emailed form to nurse. Will call back to confirm receipt .

## 2023-12-11 NOTE — TELEPHONE ENCOUNTER
PER ANNA from Dr. Mancuso Res LETROZOLE 2.5MG [facility]) ONE TAB ONCE A DAY QUANTITY 90 REFILL 4

## 2024-03-18 ENCOUNTER — OFFICE VISIT (OUTPATIENT)
Age: 64
End: 2024-03-18
Payer: COMMERCIAL

## 2024-03-18 DIAGNOSIS — Z92.3 S/P RADIATION THERAPY: ICD-10-CM

## 2024-03-18 DIAGNOSIS — Z17.0 MALIGNANT NEOPLASM OF UPPER-OUTER QUADRANT OF RIGHT BREAST IN FEMALE, ESTROGEN RECEPTOR POSITIVE (HCC): Primary | ICD-10-CM

## 2024-03-18 DIAGNOSIS — C50.411 MALIGNANT NEOPLASM OF UPPER-OUTER QUADRANT OF RIGHT BREAST IN FEMALE, ESTROGEN RECEPTOR POSITIVE (HCC): Primary | ICD-10-CM

## 2024-03-18 DIAGNOSIS — Z85.3 HISTORY OF BREAST CANCER IN FEMALE: ICD-10-CM

## 2024-03-18 DIAGNOSIS — Z98.890 S/P LUMPECTOMY OF BREAST: ICD-10-CM

## 2024-03-18 PROCEDURE — 99213 OFFICE O/P EST LOW 20 MIN: CPT | Performed by: NURSE PRACTITIONER

## 2024-03-18 NOTE — PROGRESS NOTES
HISTORY OF PRESENT ILLNESS  Jennifer Parrish is a 63 y.o. female     HPI  Established patient presents for follow-up for RIGHT breast cancer.  Denies breast mass, skin changes, nipple discharge and pain.             Breast history -   Referring - unknown  21 - screening mammogram: BI-RADS 0  8/10/21 - RIGHT breast diagnostic mammogram done and breast US done at [facility]: BI-RADS 4 - 4 mm mass 12:00 right breast   8/10/21 - RIGHT breast 12 OC Biopsy - IDC, grade 1, ER positive 95%, [place] positive 30%, HER2 negative.  21 - breast MRI done at [facility]  21 - RIGHT breast lumpectomy and SLNB - Dr. Segal  No residual tumor in breast, 2 lymph nodes negative - T0N0 on surgery  Overall - T1aN0  21 - completed XRT - Dr. Meng   2022 - start letrozole - Dr. Cantrell  2022 - BILATERAL breast reduction - Dr. Lagunas           Family history -  Mother diagnosed with breast cancer at age 65. .  Father had prostate cancer.    - genetic testing done - clinically negative with VUS           OB History    No obstetric history on file.      Obstetric Comments   Menarche 12, LMP , # of children 2, age of 1st delivery 22, Hysterectomy/oophorectomy /NO/NO, Breast bx NO, history of breast feeding NO, BCP YES, Hormone therapy YES                     Past Surgical History:   Procedure Laterality Date    BREAST LUMPECTOMY Right 2021    RIGHT BREAST LUMPECTOMY WITH ULTRASOUND performed by Kay Segal MD at [facility] AMBULATORY OR    BREAST RECONSTRUCTION Bilateral 2022    RIGHT BREAST RECONSTRUCTION LEFT BREAST REDUCTION performed by Ronaldo Lagunas MD at [facility] MAIN OR    BREAST REDUCTION SURGERY Left 2022    IR INJ EPIDURAL LUMBAR SACRAL WO IMG      Dr. Champion    MENISCECTOMY Right     ORTHOPEDIC SURGERY Left     elbow tendon repair           Mammogram Result (most recent):  [facility] OMAYRA DIGITAL DIAGNOSTIC BILATERAL 2023    Narrative  INDICATION: Personal history of

## 2024-06-18 ENCOUNTER — HOSPITAL ENCOUNTER (OUTPATIENT)
Facility: HOSPITAL | Age: 64
Discharge: HOME | End: 2024-06-21
Payer: COMMERCIAL

## 2024-06-18 DIAGNOSIS — Z85.3 HISTORY OF BREAST CANCER IN FEMALE: ICD-10-CM

## 2024-06-18 PROCEDURE — G0279 TOMOSYNTHESIS, MAMMO: HCPCS

## 2024-09-16 ENCOUNTER — OFFICE VISIT (OUTPATIENT)
Age: 64
End: 2024-09-16
Payer: COMMERCIAL

## 2024-09-16 VITALS — HEIGHT: 63 IN | BODY MASS INDEX: 25.69 KG/M2 | WEIGHT: 145 LBS

## 2024-09-16 DIAGNOSIS — Z92.3 S/P RADIATION THERAPY: ICD-10-CM

## 2024-09-16 DIAGNOSIS — Z17.0 MALIGNANT NEOPLASM OF UPPER-OUTER QUADRANT OF RIGHT BREAST IN FEMALE, ESTROGEN RECEPTOR POSITIVE (HCC): Primary | ICD-10-CM

## 2024-09-16 DIAGNOSIS — C50.411 MALIGNANT NEOPLASM OF UPPER-OUTER QUADRANT OF RIGHT BREAST IN FEMALE, ESTROGEN RECEPTOR POSITIVE (HCC): Primary | ICD-10-CM

## 2024-09-16 DIAGNOSIS — Z98.890 S/P LUMPECTOMY OF BREAST: ICD-10-CM

## 2024-09-16 DIAGNOSIS — Z85.3 HISTORY OF BREAST CANCER IN FEMALE: ICD-10-CM

## 2024-09-16 PROCEDURE — 99213 OFFICE O/P EST LOW 20 MIN: CPT | Performed by: NURSE PRACTITIONER

## 2024-10-15 NOTE — PROGRESS NOTES
Jennifer Parrish is a 62 y.o. female here for one year follow up for right breast cancer.  She is taking Letrozole.  Pt taking everyday.  She will need refill in one month.  Pt doing well. Walking every day.  She will be starting Efudex soon.     1. Have you been to the ER, urgent care clinic since your last visit?  Hospitalized since your last visit? no    2. Have you seen or consulted any other health care providers outside of the [facility] since your last visit?  Include any pap smears or colon screening.  Allergist, PCP

## 2024-10-17 ENCOUNTER — OFFICE VISIT (OUTPATIENT)
Age: 64
End: 2024-10-17
Payer: COMMERCIAL

## 2024-10-17 VITALS
HEART RATE: 80 BPM | BODY MASS INDEX: 24.95 KG/M2 | SYSTOLIC BLOOD PRESSURE: 128 MMHG | DIASTOLIC BLOOD PRESSURE: 80 MMHG | TEMPERATURE: 98.3 F | WEIGHT: 140.8 LBS | OXYGEN SATURATION: 97 % | HEIGHT: 63 IN

## 2024-10-17 DIAGNOSIS — Z79.899 ENCOUNTER FOR MONITORING ADJUVANT HORMONAL THERAPY: ICD-10-CM

## 2024-10-17 DIAGNOSIS — Z17.0 ESTROGEN RECEPTOR POSITIVE STATUS (ER+): ICD-10-CM

## 2024-10-17 DIAGNOSIS — Z51.81 ENCOUNTER FOR MONITORING ADJUVANT HORMONAL THERAPY: ICD-10-CM

## 2024-10-17 DIAGNOSIS — C50.411 MALIGNANT NEOPLASM OF UPPER-OUTER QUADRANT OF RIGHT FEMALE BREAST, UNSPECIFIED ESTROGEN RECEPTOR STATUS (HCC): Primary | ICD-10-CM

## 2024-10-17 PROCEDURE — 99213 OFFICE O/P EST LOW 20 MIN: CPT | Performed by: INTERNAL MEDICINE

## 2024-10-17 RX ORDER — LETROZOLE 2.5 MG/1
2.5 TABLET, FILM COATED ORAL DAILY
Qty: 90 TABLET | Refills: 4 | Status: SHIPPED | OUTPATIENT
Start: 2024-10-17

## 2024-10-17 RX ORDER — MULTIVITAMIN WITH IRON
1 TABLET ORAL DAILY
COMMUNITY

## 2024-10-17 ASSESSMENT — PATIENT HEALTH QUESTIONNAIRE - PHQ9
2. FEELING DOWN, DEPRESSED OR HOPELESS: NOT AT ALL
SUM OF ALL RESPONSES TO PHQ QUESTIONS 1-9: 0
SUM OF ALL RESPONSES TO PHQ9 QUESTIONS 1 & 2: 0
1. LITTLE INTEREST OR PLEASURE IN DOING THINGS: NOT AT ALL

## 2024-10-17 NOTE — PROGRESS NOTES
[facility] [place]   at [facility]   [address], [facility] [address], [address]   [place] [address]   [phone]      Follow-up Note           Patient: Jennifer Parrish  MRN: [id number]   SSN: [id number]          YOB: 1960                Diagnosis:        1. Right breast carcinoma: Dx: 8/2021   T1a N0 M0 (Stage I) infiltrating ductal carcinoma, Tumor size < 5 mm, LN -ve, grade 1, ER 95%, NJ 30%, Her 2 -ve           Treatment:        1. Right breast lumpectomy with SLNB - 9/14/2021   2. Completed XRT - DR. Meng 12/2/2022   3. Started Letrozole - 1/2022        Subjective:         Jennifer Parrish is a 62 y.o.  female who I am seeing for a new diagnosis of right sided invasive breast carcinoma on request of Dr. Segal. She underwent a routine screening mammogram and was noted to have an abnormal mammogram.  A biopsy of the right breast revealed IDC Gr 1 ER 95%, NJ 30% and Her 2 -ve. She underwent a right sided lumpectomy on 09/14/2021. She completed adjuvant radiation on 12/2/2021. Ms. Parrish reports having hot flashes since starting Letrozole in January.           Review of Systems:      Constitutional: Hot flashes   Eyes: negative   Ears, Nose, Mouth, Throat, and Face: negative   Respiratory: negative   Cardiovascular: negative   Gastrointestinal: negative   Genitourinary:negative   Integument/Breast: negative   Hematologic/Lymphatic: negative   Musculoskeletal:negative   Neurological: negative     Past Medical History:   Diagnosis Date    ADD (attention deficit disorder)     Arthritis     back    Breast cancer (HCC)     9/2021 RIGHT BREAST LUMPECTOMY WITH ULTRASOUND performed by Kay Segal MD at [facility] AMBULATORY OR    Cancer (HCC)     RIGHT BREAST    Chronic pain     RIGHT HIP, BUTTUCK DOWN LEG. \"I WAS TOLD IT WAS NERVE PAIN\"    COVID-19 09/2022    History of therapeutic radiation     Hypertension     CONTROLLED WITH MEDS-PCP    Psychiatric

## 2025-06-20 ENCOUNTER — HOSPITAL ENCOUNTER (OUTPATIENT)
Facility: HOSPITAL | Age: 65
Discharge: HOME | End: 2025-06-23
Payer: COMMERCIAL

## 2025-06-20 DIAGNOSIS — Z85.3 HISTORY OF BREAST CANCER IN FEMALE: ICD-10-CM

## 2025-06-20 PROCEDURE — G0279 TOMOSYNTHESIS, MAMMO: HCPCS

## (undated) DEVICE — PREP SKN CHLRAPRP APL 26ML STR --

## (undated) DEVICE — CHEST PACK: Brand: MEDLINE INDUSTRIES, INC.

## (undated) DEVICE — PENCIL SMK EVAC L10FT DIA95MM TBNG NONSTICK W ADPT TO 22MM

## (undated) DEVICE — ADHESIVE SKIN CLSR 1ML TISS HI VISC EXOFIN

## (undated) DEVICE — Device

## (undated) DEVICE — INSULATED BLADE ELECTRODE: Brand: EDGE

## (undated) DEVICE — GARMENT,MEDLINE,DVT,INT,CALF,MED, GEN2: Brand: MEDLINE

## (undated) DEVICE — 3M™ TEGADERM™ TRANSPARENT FILM DRESSING FRAME STYLE, 1628, 6 IN X 8 IN (15 CM X 20 CM), 10/CT 8CT/CASE: Brand: 3M™ TEGADERM™

## (undated) DEVICE — INTRO 6F 23CM INPT INTR KIT --

## (undated) DEVICE — SPONGE LAP 18X18IN STRL -- 5/PK

## (undated) DEVICE — SUT SLK 2-0SH 30IN BLK --

## (undated) DEVICE — INTENDED FOR TISSUE SEPARATION, AND OTHER PROCEDURES THAT REQUIRE A SHARP SURGICAL BLADE TO PUNCTURE OR CUT.: Brand: BARD-PARKER ® CARBON RIB-BACK BLADES

## (undated) DEVICE — NEEDLE HYPO 25GA L1.5IN BVL ORIENTED ECLIPSE

## (undated) DEVICE — DERMABOND SKIN ADH 0.7ML -- DERMABOND ADVANCED 12/BX

## (undated) DEVICE — AEGIS 1" DISK 4MM HOLE, PEEL OPEN: Brand: MEDLINE

## (undated) DEVICE — SUTURE PDS II SZ 3-0 L18IN ABSRB UD L19MM PS-2 3/8 CIR PRIM Z497G

## (undated) DEVICE — CHEST/BREAST-MRMCASU: Brand: MEDLINE INDUSTRIES, INC.

## (undated) DEVICE — SOLUTION IRRIG 1000ML 0.9% SOD CHL USP POUR PLAS BTL

## (undated) DEVICE — BLANKET WRM W25XL64IN NONWOVEN SFT LTWT PLIABLE HYPR

## (undated) DEVICE — GLOVE ORANGE PI 7   MSG9070

## (undated) DEVICE — 4-PORT MANIFOLD: Brand: NEPTUNE 2

## (undated) DEVICE — KIT TISS EXP W/ 60ML SYR 122CM TRNSF SET PIERCING DEV L25CM

## (undated) DEVICE — COVER US PRB W12XL244CM FLD IORT STR TIP

## (undated) DEVICE — SUTURE PDS II SZ 2-0 L27IN ABSRB VLT L36MM CT-1 1/2 CIR Z339H

## (undated) DEVICE — STAPLER SKN INSORB 30 COUNT --

## (undated) DEVICE — ROCKER SWITCH PENCIL BLADE ELECTRODE, HOLSTER: Brand: EDGE

## (undated) DEVICE — SPONGE GZ W4XL4IN COT 12 PLY TYP VII WVN C FLD DSGN

## (undated) DEVICE — 450 ML BOTTLE OF 0.05% CHLORHEXIDINE GLUCONATE IN 99.95% STERILE WATER FOR IRRIGATION, USP AND APPLICATOR.: Brand: IRRISEPT ANTIMICROBIAL WOUND LAVAGE

## (undated) DEVICE — CURVED, SMALL JAW, OPEN SEALER/DIVIDER: Brand: LIGASURE

## (undated) DEVICE — SYR 10ML LUER LOK 1/5ML GRAD --

## (undated) DEVICE — BLADE ELECTRODE: Brand: EDGE

## (undated) DEVICE — GLOVE SURG SZ 7 L12IN FNGR THK79MIL GRN LTX FREE

## (undated) DEVICE — BANDAGE,GAUZE,BULKEE II,4.5"X4.1YD,STRL: Brand: MEDLINE

## (undated) DEVICE — REM POLYHESIVE ADULT PATIENT RETURN ELECTRODE: Brand: VALLEYLAB

## (undated) DEVICE — SUT MCRYL 4-0 27IN PS2 UD --

## (undated) DEVICE — SUT ETHLN 3-0 18IN PS2 BLK --

## (undated) DEVICE — SUTURE MCRYL SZ 4-0 L27IN ABSRB UD L19MM PS-2 1/2 CIR PRIM Y426H

## (undated) DEVICE — GLOVE SURG SZ 6 L12IN FNGR THK79MIL GRN LTX FREE

## (undated) DEVICE — PREP KIT PEEL PTCH POVIDONE IOD

## (undated) DEVICE — HANDLE LT SNAP ON ULT DURABLE LENS FOR TRUMPF ALC DISPOSABLE

## (undated) DEVICE — YANKAUER,BULB TIP,W/O VENT,RIGID,STERILE: Brand: MEDLINE